# Patient Record
Sex: FEMALE | Race: WHITE | Employment: OTHER | URBAN - METROPOLITAN AREA
[De-identification: names, ages, dates, MRNs, and addresses within clinical notes are randomized per-mention and may not be internally consistent; named-entity substitution may affect disease eponyms.]

---

## 2017-08-18 ENCOUNTER — APPOINTMENT (OUTPATIENT)
Dept: GENERAL RADIOLOGY | Age: 82
DRG: 069 | End: 2017-08-18
Attending: EMERGENCY MEDICINE
Payer: MEDICARE

## 2017-08-18 ENCOUNTER — HOSPITAL ENCOUNTER (INPATIENT)
Age: 82
LOS: 1 days | Discharge: HOME | DRG: 069 | End: 2017-08-19
Attending: EMERGENCY MEDICINE | Admitting: INTERNAL MEDICINE
Payer: MEDICARE

## 2017-08-18 ENCOUNTER — APPOINTMENT (OUTPATIENT)
Dept: CT IMAGING | Age: 82
DRG: 069 | End: 2017-08-18
Attending: EMERGENCY MEDICINE
Payer: MEDICARE

## 2017-08-18 DIAGNOSIS — R47.1 DYSARTHRIA: Primary | ICD-10-CM

## 2017-08-18 PROBLEM — I63.412 STROKE DUE TO EMBOLISM OF LEFT MIDDLE CEREBRAL ARTERY (HCC): Status: ACTIVE | Noted: 2017-08-18

## 2017-08-18 LAB
ALBUMIN SERPL-MCNC: 3.6 G/DL (ref 3.2–4.6)
ALBUMIN/GLOB SERPL: 1.1 {RATIO} (ref 1.2–3.5)
ALP SERPL-CCNC: 57 U/L (ref 50–136)
ALT SERPL-CCNC: 14 U/L (ref 12–65)
ANION GAP SERPL CALC-SCNC: 6 MMOL/L (ref 7–16)
APTT PPP: 26.1 SEC (ref 23.5–31.7)
AST SERPL-CCNC: 18 U/L (ref 15–37)
BACTERIA URNS QL MICRO: ABNORMAL /HPF
BASOPHILS # BLD: 0 K/UL (ref 0–0.2)
BASOPHILS NFR BLD: 0 % (ref 0–2)
BILIRUB SERPL-MCNC: 0.6 MG/DL (ref 0.2–1.1)
BUN SERPL-MCNC: 17 MG/DL (ref 8–23)
CALCIUM SERPL-MCNC: 9.1 MG/DL (ref 8.3–10.4)
CASTS URNS QL MICRO: 0 /LPF
CHLORIDE SERPL-SCNC: 108 MMOL/L (ref 98–107)
CO2 SERPL-SCNC: 30 MMOL/L (ref 21–32)
CREAT SERPL-MCNC: 0.76 MG/DL (ref 0.6–1)
CRP SERPL-MCNC: <0.3 MG/DL (ref 0–0.9)
CRYSTALS URNS QL MICRO: 0 /LPF
DIFFERENTIAL METHOD BLD: ABNORMAL
EOSINOPHIL # BLD: 0.3 K/UL (ref 0–0.8)
EOSINOPHIL NFR BLD: 4 % (ref 0.5–7.8)
EPI CELLS #/AREA URNS HPF: ABNORMAL /HPF
ERYTHROCYTE [DISTWIDTH] IN BLOOD BY AUTOMATED COUNT: 13.2 % (ref 11.9–14.6)
ERYTHROCYTE [SEDIMENTATION RATE] IN BLOOD: 14 MM/HR (ref 0–30)
GLOBULIN SER CALC-MCNC: 3.4 G/DL (ref 2.3–3.5)
GLUCOSE BLD STRIP.AUTO-MCNC: 98 MG/DL (ref 65–100)
GLUCOSE SERPL-MCNC: 91 MG/DL (ref 65–100)
HCT VFR BLD AUTO: 39.8 % (ref 35.8–46.3)
HGB BLD-MCNC: 13.6 G/DL (ref 11.7–15.4)
IMM GRANULOCYTES # BLD: 0 K/UL (ref 0–0.5)
IMM GRANULOCYTES NFR BLD: 0.1 % (ref 0–5)
INR BLD: 1.1 (ref 0.9–1.2)
INR PPP: 1 (ref 0.9–1.2)
LYMPHOCYTES # BLD: 2.7 K/UL (ref 0.5–4.6)
LYMPHOCYTES NFR BLD: 34 % (ref 13–44)
MCH RBC QN AUTO: 31.3 PG (ref 26.1–32.9)
MCHC RBC AUTO-ENTMCNC: 34.2 G/DL (ref 31.4–35)
MCV RBC AUTO: 91.5 FL (ref 79.6–97.8)
MONOCYTES # BLD: 0.8 K/UL (ref 0.1–1.3)
MONOCYTES NFR BLD: 10 % (ref 4–12)
MUCOUS THREADS URNS QL MICRO: 0 /LPF
NEUTS SEG # BLD: 4.1 K/UL (ref 1.7–8.2)
NEUTS SEG NFR BLD: 52 % (ref 43–78)
PLATELET # BLD AUTO: 250 K/UL (ref 150–450)
PMV BLD AUTO: 9.2 FL (ref 10.8–14.1)
POTASSIUM SERPL-SCNC: 3.6 MMOL/L (ref 3.5–5.1)
PROT SERPL-MCNC: 7 G/DL (ref 6.3–8.2)
PROTHROMBIN TIME: 10.8 SEC (ref 9.6–12)
PT BLD: 13.6 SECS (ref 9.6–11.6)
RBC # BLD AUTO: 4.35 M/UL (ref 4.05–5.25)
RBC #/AREA URNS HPF: 0 /HPF
SODIUM SERPL-SCNC: 144 MMOL/L (ref 136–145)
WBC # BLD AUTO: 7.9 K/UL (ref 4.3–11.1)
WBC URNS QL MICRO: ABNORMAL /HPF

## 2017-08-18 PROCEDURE — 74011250636 HC RX REV CODE- 250/636: Performed by: INTERNAL MEDICINE

## 2017-08-18 PROCEDURE — 74011250637 HC RX REV CODE- 250/637: Performed by: EMERGENCY MEDICINE

## 2017-08-18 PROCEDURE — 93005 ELECTROCARDIOGRAM TRACING: CPT | Performed by: INTERNAL MEDICINE

## 2017-08-18 PROCEDURE — 81015 MICROSCOPIC EXAM OF URINE: CPT | Performed by: EMERGENCY MEDICINE

## 2017-08-18 PROCEDURE — 85610 PROTHROMBIN TIME: CPT | Performed by: EMERGENCY MEDICINE

## 2017-08-18 PROCEDURE — 71020 XR CHEST PA LAT: CPT

## 2017-08-18 PROCEDURE — 86140 C-REACTIVE PROTEIN: CPT | Performed by: EMERGENCY MEDICINE

## 2017-08-18 PROCEDURE — 85610 PROTHROMBIN TIME: CPT

## 2017-08-18 PROCEDURE — 85652 RBC SED RATE AUTOMATED: CPT | Performed by: EMERGENCY MEDICINE

## 2017-08-18 PROCEDURE — 99285 EMERGENCY DEPT VISIT HI MDM: CPT | Performed by: EMERGENCY MEDICINE

## 2017-08-18 PROCEDURE — 80053 COMPREHEN METABOLIC PANEL: CPT | Performed by: EMERGENCY MEDICINE

## 2017-08-18 PROCEDURE — 70450 CT HEAD/BRAIN W/O DYE: CPT

## 2017-08-18 PROCEDURE — 85730 THROMBOPLASTIN TIME PARTIAL: CPT | Performed by: EMERGENCY MEDICINE

## 2017-08-18 PROCEDURE — 74011250637 HC RX REV CODE- 250/637: Performed by: INTERNAL MEDICINE

## 2017-08-18 PROCEDURE — 82962 GLUCOSE BLOOD TEST: CPT

## 2017-08-18 PROCEDURE — 85025 COMPLETE CBC W/AUTO DIFF WBC: CPT | Performed by: EMERGENCY MEDICINE

## 2017-08-18 PROCEDURE — 65660000000 HC RM CCU STEPDOWN

## 2017-08-18 RX ORDER — SODIUM CHLORIDE 0.9 % (FLUSH) 0.9 %
5-10 SYRINGE (ML) INJECTION EVERY 8 HOURS
Status: DISCONTINUED | OUTPATIENT
Start: 2017-08-18 | End: 2017-08-19 | Stop reason: HOSPADM

## 2017-08-18 RX ORDER — PRAVASTATIN SODIUM 20 MG/1
20 TABLET ORAL
Status: DISCONTINUED | OUTPATIENT
Start: 2017-08-18 | End: 2017-08-19 | Stop reason: HOSPADM

## 2017-08-18 RX ORDER — LEVOTHYROXINE SODIUM 50 UG/1
50 TABLET ORAL
Status: DISCONTINUED | OUTPATIENT
Start: 2017-08-19 | End: 2017-08-19 | Stop reason: HOSPADM

## 2017-08-18 RX ORDER — HYDRALAZINE HYDROCHLORIDE 20 MG/ML
10 INJECTION INTRAMUSCULAR; INTRAVENOUS
Status: DISCONTINUED | OUTPATIENT
Start: 2017-08-18 | End: 2017-08-19 | Stop reason: HOSPADM

## 2017-08-18 RX ORDER — NAPROXEN 250 MG/1
250 TABLET ORAL 2 TIMES DAILY WITH MEALS
Status: DISCONTINUED | OUTPATIENT
Start: 2017-08-19 | End: 2017-08-19 | Stop reason: HOSPADM

## 2017-08-18 RX ORDER — ACETAMINOPHEN 325 MG/1
650 TABLET ORAL
Status: DISCONTINUED | OUTPATIENT
Start: 2017-08-18 | End: 2017-08-19 | Stop reason: HOSPADM

## 2017-08-18 RX ORDER — ASPIRIN 81 MG/1
162 TABLET ORAL DAILY
Status: DISCONTINUED | OUTPATIENT
Start: 2017-08-19 | End: 2017-08-18

## 2017-08-18 RX ORDER — ASPIRIN 325 MG
325 TABLET ORAL DAILY
Status: DISCONTINUED | OUTPATIENT
Start: 2017-08-19 | End: 2017-08-19 | Stop reason: HOSPADM

## 2017-08-18 RX ORDER — SODIUM CHLORIDE 0.9 % (FLUSH) 0.9 %
5-10 SYRINGE (ML) INJECTION AS NEEDED
Status: DISCONTINUED | OUTPATIENT
Start: 2017-08-18 | End: 2017-08-19 | Stop reason: HOSPADM

## 2017-08-18 RX ORDER — ALPRAZOLAM 0.25 MG/1
0.25 TABLET ORAL
Status: DISCONTINUED | OUTPATIENT
Start: 2017-08-18 | End: 2017-08-19 | Stop reason: HOSPADM

## 2017-08-18 RX ORDER — ENOXAPARIN SODIUM 100 MG/ML
40 INJECTION SUBCUTANEOUS EVERY 24 HOURS
Status: DISCONTINUED | OUTPATIENT
Start: 2017-08-18 | End: 2017-08-19 | Stop reason: HOSPADM

## 2017-08-18 RX ORDER — GUAIFENESIN 100 MG/5ML
324 LIQUID (ML) ORAL
Status: COMPLETED | OUTPATIENT
Start: 2017-08-18 | End: 2017-08-18

## 2017-08-18 RX ORDER — LANOLIN ALCOHOL/MO/W.PET/CERES
1000 CREAM (GRAM) TOPICAL DAILY
Status: DISCONTINUED | OUTPATIENT
Start: 2017-08-19 | End: 2017-08-19 | Stop reason: HOSPADM

## 2017-08-18 RX ORDER — ONDANSETRON 2 MG/ML
4 INJECTION INTRAMUSCULAR; INTRAVENOUS
Status: DISCONTINUED | OUTPATIENT
Start: 2017-08-18 | End: 2017-08-19 | Stop reason: HOSPADM

## 2017-08-18 RX ORDER — AMOXICILLIN 250 MG
2 CAPSULE ORAL
Status: DISCONTINUED | OUTPATIENT
Start: 2017-08-18 | End: 2017-08-19 | Stop reason: HOSPADM

## 2017-08-18 RX ADMIN — STANDARDIZED SENNA CONCENTRATE AND DOCUSATE SODIUM 2 TABLET: 8.6; 5 TABLET, FILM COATED ORAL at 22:04

## 2017-08-18 RX ADMIN — Medication 10 ML: at 22:11

## 2017-08-18 RX ADMIN — PRAVASTATIN SODIUM 20 MG: 20 TABLET ORAL at 22:09

## 2017-08-18 RX ADMIN — ENOXAPARIN SODIUM 40 MG: 40 INJECTION SUBCUTANEOUS at 22:04

## 2017-08-18 RX ADMIN — LACTULOSE 10 G: 20 SOLUTION ORAL at 22:08

## 2017-08-18 RX ADMIN — ASPIRIN 81 MG 324 MG: 81 TABLET ORAL at 18:17

## 2017-08-18 NOTE — IP AVS SNAPSHOT
Addi Trujillo 
 
 
 [facility] 
[phone] Patient: Ivet Angela MRN: [id number] MLP:[id number] You are allergic to the following Allergen Reactions Crestor (Rosuvastatin) Other (comments) Gabapentin Other (comments) Sulfa (Sulfonamide Antibiotics) Unknown (comments) Zetia (Ezetimibe) Other (comments) Recent Documentation Height Weight BMI Smoking Status 1.676 m 65.8 kg 22.05 kg/m2 Never Smoker Emergency Contacts Name Discharge Info Relation Home Work Mobile Jennifer Nuñez  Other Relative [6] [phone] Maren Vitale  Brother [24] [phone] About your hospitalization You were admitted on:  August 18, 2017 You last received care in the:  [address] SURG You were discharged on:  August 19, 2017 Unit phone number:  [phone] Why you were hospitalized Your primary diagnosis was:  Not on File Your diagnoses also included:  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), Geno Rhythm Disorder, Dyslipidemia, Hypertension, Tia (Transient Ischemic Attack) Providers Seen During Your Hospitalizations Provider Role Specialty Primary office phone Alma Mcbride MD Attending Provider Emergency Medicine [phone] Princess Breann MD Attending Provider Internal Medicine [phone] Your Primary Care Physician (PCP) Primary Care Physician Office Phone Office Fax Sung Randall [phone] Follow-up Information Follow up With Details Comments Contact Info Kavitha Adler MD Call Call on Monday for an appointment in 1 week [facility] [address] [place] [address] [employer] [place] [address] [phone] Current Discharge Medication List  
  
START taking these medications Dose & Instructions Dispensing Information Comments Morning Noon Evening Bedtime  
 pravastatin 20 mg tablet Commonly known as:  PRAVACHOL Your next dose is:  Tomorrow at bedtime Dose:  20 mg Take 1 Tab by mouth nightly. Quantity:  30 Tab Refills:  3 CONTINUE these medications which have CHANGED Dose & Instructions Dispensing Information Comments Morning Noon Evening Bedtime  
 aspirin delayed-release 81 mg tablet What changed:  how much to take Your next dose is:  Tomorrow AM  
   
 Dose:  162 mg Take 2 Tabs by mouth daily. Quantity:  60 Tab Refills:  11 CONTINUE these medications which have NOT CHANGED Dose & Instructions Dispensing Information Comments Morning Noon Evening Bedtime ALPRAZolam 0.25 mg tablet Commonly known as:  Shermon Colljudith Your next dose is:  Tomorrow Take  by mouth. Refills:  0  
     
   
   
   
  
 cholecalciferol (VITAMIN D3) 5,000 unit Tab tablet Commonly known as:  VITAMIN D3 Your next dose is:  Tomorrow Take  by mouth daily. Refills:  0  
     
   
   
   
  
 cyanocobalamin 1,000 mcg sublingual tablet Commonly known as:  VITAMIN B-12 Dose:  1000 mcg Take 1,000 mcg by mouth daily. Refills:  0  
     
   
   
   
  
 lactulose 10 gram/15 mL solution Commonly known as:  Khadijah Basket Your last dose was:  8/19/17 @ 5:00 PM  
Your next dose is:  Tomorrow AM  
   
 Take  by mouth three (3) times daily. Refills:  0  
     
   
   
   
  
 levothyroxine 50 mcg tablet Commonly known as:  SYNTHROID Your next dose is:  Tomorrow Take  by mouth Daily (before breakfast). Refills:  0  
     
   
   
   
  
 meclizine 25 mg tablet Commonly known as:  ANTIVERT Your next dose is:  When needed Take  by mouth three (3) times daily as needed. Refills:  0  
     
   
   
   
  
 naproxen sodium 220 mg Cap Your last dose was:  8/19/17 @ 5:00 PM  
Your next dose is:  Tomorrow AM  
   
 Take  by mouth. Refills:  0  
     
   
   
   
  
 vitamin E 400 unit capsule Commonly known as:  [address] Your next dose is:  Tomorrow Take  by mouth daily. Refills:  0 Where to Get Your Medications These medications were sent to [facility] [address] [facility]. [address]  [place] [address] [place] [address] Phone:  [phone]  
  aspirin delayed-release 81 mg tablet  
 pravastatin 20 mg tablet Discharge Instructions Taking Aspirin to Prevent Heart Attack and Stroke: Care Instructions Your Care Instructions Aspirin acts as a \"blood thinner. \" It prevents blood clots from forming. When taken during and after a heart attack, it can reduce your chance of dying. And it's used if you have a stent in your coronary artery. Also, aspirin helps certain people lower their risk of a heart attack or stroke. Be sure you know what dose of aspirin to take and how often to take it. Low-dose aspirin is typically 81 mg. But the dose for daily aspirin can range from 81 mg to 325 mg. Taking aspirin every day can cause bleeding. It may not be safe if you have stomach ulcers. And it may not be safe if you have high blood pressure that is not controlled. If you take aspirin pills every day, do not take ones that have other ingredients such as caffeine or sodium. Before you start to take aspirin, tell your doctor all the medicines, vitamins, herbal products, and supplements you take. Follow-up care is a key part of your treatment and safety. Be sure to make and go to all appointments, and call your doctor if you are having problems. It's also a good idea to know your test results and keep a list of the medicines you take. How can you care for yourself at home? · Take aspirin with a full glass of water unless your doctor tells you not to. Do not lie down right after you take it. · If you have a stent in your coronary artery, take your aspirin as your heart doctor says to. If another doctor says to stop taking the aspirin for any reason, talk to your heart doctor before you stop. · Do not chew or crush the coated or sustained-release forms of aspirin. · Ask your doctor if you can drink alcohol while you take aspirin. And ask how much you can drink. Too much alcohol with aspirin can cause stomach bleeding. · Do not take aspirin if you are pregnant, unless your doctor says it is okay. · Keep all aspirin out of children's reach. · Throw aspirin away if it starts to smell like vinegar. · Do not take aspirin if you have gout or if you take prescription blood thinners, unless your doctor has told you to. · Do not take prescription or over-the-counter medicines, vitamins, herbal products, or supplements without talking to your doctor first. Hilary Pintoly the label before you take another over-the-counter medicine. Many contain aspirin. So they could cause you to take too much aspirin. · Talk with your doctor before you take a pain medicine. Ask which type of medicine you can take and how to take it safely with aspirin. · Tell your doctor or dentist before a surgery or procedure that you take aspirin. He or she will tell you if you should stop taking aspirin before your surgery or procedure. Make sure that you understand exactly what your doctor wants you to do. Where can you learn more? Go to http://daniel-iam.info/. Enter E015 in the search box to learn more about \"Taking Aspirin to Prevent Heart Attack and Stroke: Care Instructions. \" Current as of: April 3, 2017 Content Version: 11.3 © 5850-0709 [employer]. Care instructions adapted under license by [employer] (which disclaims liability or warranty for this information).  If you have questions about a medical condition or this instruction, always ask your healthcare professional. Tommie Ibarra, [facility] disclaims any warranty or liability for your use of this information. Learning About Antiplatelet Medicines After a Stroke Introduction If you have had a stroke, you may have concerns about having another one. You want to do all you can do to avoid this. If your stroke was caused by a blood clot, one of the best things you can do is to take antiplatelet medicines. They can help prevent another stroke. In most cases, you don't take them if you had a stroke caused by a leak in an artery. These medicines are often called blood thinners. But they don't thin your blood. They work to keep platelets from sticking together and forming blood clots. (A platelet is a type of blood cell.) Blood clots can cause a stroke if they block a blood vessel in the brain. So by preventing blood clots, you are helping to prevent a stroke. Examples · Aspirin (Kaiden, Bufferin, Ecotrin) · Aspirin with dipyridamole (Aggrenox) · Clopidogrel (Plavix) Possible side effects These medicines make your blood take longer than normal to clot. This can cause bleeding, and you may bruise easily. In rare cases, they can cause you to bleed inside your body without an injury. If you have an injury, you might have bleeding that is hard to control. These medicines may have other side effects. Depending on which one you take, you may: 
· Have diarrhea. · Feel sick to your stomach. · Have a headache. · Have some mild belly pain. You may have other side effects or reactions not listed here. Check the information that comes with your medicine. What to know about taking this medicine · Be sure you get instructions about how to take your medicine safely. Blood thinners can cause serious bleeding problems. · Be safe with medicines. Take your medicines exactly as prescribed. Call your doctor if you think you are having a problem with your medicine.  
· Check with your doctor or pharmacist before you use any other medicines, including over-the-counter medicines. Make sure your doctor knows all of the medicines, vitamins, herbal products, and supplements you take. Taking some medicines together can cause problems. Where can you learn more? Go to http://daniel-iam.info/. Enter A661 in the search box to learn more about \"Learning About Antiplatelet Medicines After a Stroke. \" Current as of: November 17, 2016 Content Version: 11.3 © 4047-4387 [employer]. Care instructions adapted under license by [employer] (which disclaims liability or warranty for this information). If you have questions about a medical condition or this instruction, always ask your healthcare professional. [address] any warranty or liability for your use of this information. Bradycardia: Care Instructions Your Care Instructions Bradycardia is a slow heart rate. If your heart beats too slowly, it can't supply your body with enough blood. This can make you weak or dizzy. Or it may make you pass out. Sometimes medicine can cause this problem. If this happens, your doctor may have you adjust one of your medicines. If a medicine is not the problem, your doctor may recommend a pacemaker. It is important to treat bradycardia so that you don't get more serious health problems. Your doctor will want to see you on a routine schedule to make sure that your heartbeat is normal. 
Follow-up care is a key part of your treatment and safety. Be sure to make and go to all appointments, and call your doctor if you are having problems. It's also a good idea to know your test results and keep a list of the medicines you take. How can you care for yourself at home? · Take your medicines exactly as prescribed. Call your doctor if you think you are having a problem with your medicine. If your bradycardia is caused by another disease, your doctor will try to treat the disease.  If it is caused by heart medicines, he or she will adjust your medicines. · Make lifestyle changes to improve your heart health. ¨ To control your cholesterol, avoid foods with a lot of fat, saturated fat, or sodium. Try to eat more fiber. And if your doctor says it's okay, get some exercise on most days. ¨ Do not smoke. Smoking can make your heart condition worse. If you need help quitting, talk to your doctor about stop-smoking programs and medicines. These can increase your chances of quitting for good. ¨ Limit alcohol to 2 drinks a day for men and 1 drink a day for women. Too much alcohol can cause health problems. Pacemaker If you have a pacemaker, you will get more specific information about it. Be sure to: · Check your pulse as your doctor tells you. · Have your pacemaker checked as often as your doctor recommends. You may be able to do this over the phone or computer. · Avoid strong magnetic or electrical fields. These include wand metal detectors used in airports, MRIs, welding equipment, and generators. · You will be checked several times right after you get your pacemaker and when it is time to have the battery changed. Batteries last for 5 to 15 years. · You can talk on a cell phone. But keep it 6 inches away from your pacemaker. · Microwaves, TVs, radios, and kitchen and bathroom appliances won't harm you. When should you call for help? Call 911 anytime you think you may need emergency care. For example, call if: 
· You passed out (lost consciousness). · You have symptoms of a heart attack. These may include: ¨ Chest pain or pressure, or a strange feeling in the chest. 
¨ Sweating. ¨ Shortness of breath. ¨ Nausea or vomiting. ¨ Pain, pressure, or a strange feeling in the back, neck, jaw, or upper belly or in one or both shoulders or arms. ¨ Lightheadedness or sudden weakness. ¨ A fast or irregular heartbeat.  
After you call 911, the  may tell you to chew 1 adult-strength or 2 to 4 low-dose aspirin. Wait for an ambulance. Do not try to drive yourself. · You have symptoms of a stroke. These may include: 
¨ Sudden numbness, tingling, weakness, or loss of movement in your face, arm, or leg, especially on only one side of your body. ¨ Sudden vision changes. ¨ Sudden trouble speaking. ¨ Sudden confusion or trouble understanding simple statements. ¨ Sudden problems with walking or balance. ¨ A sudden, severe headache that is different from past headaches. Call your doctor now or seek immediate medical care if: 
· You are dizzy or lightheaded, or you feel like you may faint. · You have new or increased shortness of breath. · You had a pacemaker implanted and you have signs of infection, such as: 
¨ Increased pain, swelling, warmth, or redness. ¨ Red streaks leading from the cut (incision). ¨ Pus draining from the incision. ¨ A fever. Watch closely for changes in your health, and be sure to contact your doctor if you have any problems. Where can you learn more? Go to http://daniel-iam.info/. Enter S326 in the search box to learn more about \"Bradycardia: Care Instructions. \" Current as of: April 3, 2017 Content Version: 11.3 © 9161-2075 [employer]. Care instructions adapted under license by [employer] (which disclaims liability or warranty for this information). If you have questions about a medical condition or this instruction, always ask your healthcare professional. [place] [address] any warranty or liability for your use of this information. Learning About Screening for Heart Attack and Stroke Risk What is screening for heart attack and stroke risk? Screening for heart attack and stroke risk is a way for your doctor to check your chance of having a problem called atherosclerosis. This problem is also called hardening of the arteries.  It is the starting point for most heart and blood flow problems, such as heart disease, stroke, and peripheral arterial disease. You and your doctor can use your risk score to decide if you want to take steps to lower your risk. How can you find out your risk? Your doctor looks at things that put you at risk for a heart attack and stroke. He or she might look at many things, such as: 
· Your cholesterol levels. · Your blood pressure. · Your age. · Your race. · Whether you are male or female. · Whether or not you smoke. Your doctor might use a tool to calculate a risk score for you. There are different tools that doctors use. They may show that your risk is higher or lower than it really is. But the tools give you and your doctor a good idea about your risk. What happens after screening? Knowing your risk can help you and your doctor talk about whether to take steps to lower your risk. A heart-healthy lifestyle is important for everyone. Some people also take medicine to lower their risk. You and your doctor can work together to decide what is best for you. Where can you learn more? Go to http://daniel-iam.info/. Enter J284 in the search box to learn more about \"Learning About Screening for Heart Attack and Stroke Risk. \" Current as of: October 26, 2016 Content Version: 11.3 © 6212-8831 [employer]. Care instructions adapted under license by [employer] (which disclaims liability or warranty for this information). If you have questions about a medical condition or this instruction, always ask your healthcare professional. [place] [address] any warranty or liability for your use of this information. Stroke: After Your Visit Your Care Instructions You have had a stroke. Risk factors for stroke include being overweight, smoking, and sedentary lifestyle.  This means that the blood flow to a part of your brain was blocked for some time, which damages the nerve cells in that part of the brain. The part of your body controlled by that part of your brain may not function properly now. The brain is an amazing organ that can heal itself to some degree. The stroke you had damaged part of your brain, but other parts of your brain may take over in some way for the damaged areas. You have already started this process. Going home may be hard for you and your family. The more you can try to do for yourself, the better. Remember to take each day one at a time. Follow-up care is a key part of your treatment and safety. Be sure to make and go to all appointments, and call your doctor if you are having problems. Its also a good idea to know your test results and keep a list of the medicines you take. How can you care for yourself at home? Enter a stroke rehabilitation (rehab) program, if your doctor recommends it. Physical, speech, and occupational therapies can help you manage bathing, dressing, eating, and other basics of daily living. Eat a heart-healthy diet that is low in cholesterol, saturated fat, and salt. Eat lots of fresh fruits and vegetables and foods high in fiber. Increase your activities slowly. Take short rest breaks when you get tired. Gradually increase the amount you walk. Start out by walking a little more than you did the day before. Do not drive until your doctor says it is okay. It is normal to feel sad or depressed after a stroke. If the blues last, talk to your doctor. If you are having problems with urine leakage, go to the bathroom at regular times, including when you first wake up and at bedtime. Also, limit fluids after dinner. If you are constipated, drink plenty of fluids, enough so that your urine is light yellow or clear like water.  If you have kidney, heart, or liver disease and have to limit fluids, talk with your doctor before you increase the amount of fluids you drink. Set up a regular time for using the toilet. If you continue to have constipation, your doctor may suggest using a bulking agent, such as Metamucil, or a stool softener, laxative, or enema. Medicines Take your medicines exactly as prescribed. Call your doctor if you think you are having a problem with your medicine. You may be taking several medicines. ACE (angiotensin-converting enzyme) inhibitors, angiotensin II receptor blockers (ARBs), beta-blockers, diuretics (water pills), and calcium channel blockers control your blood pressure. Statins help lower cholesterol. Your doctor may also prescribe medicines for depression, pain, sleep problems, anxiety, or agitation. If your doctor has given you medicine that prevents blood clots, such as warfarin (Coumadin), aspirin combined with extended-release dipyridamole (Aggrenox), clopidogrel (Plavix), or aspirin to prevent another stroke, you should: 
Tell your dentist, pharmacist, and other health professionals that you take these medicines. Watch for unusual bruising or bleeding, such as blood in your urine, red or black stools, or bleeding from your nose or gums. Get regular blood tests to check your clotting time if you are taking Coumadin. Wear medical alert jewelry that says you take blood thinners. You can buy this at most drugstores. Do not take any over-the-counter medicines or herbal products without talking to your doctor first. 
If you take birth control pills or hormone replacement therapy, talk to your doctor about whether they are right for you. For family members and caregivers Make the home safe. Set up a room so that your loved one does not have to climb stairs. Be sure the bathroom is on the same floor. Move throw rugs and furniture that could cause falls, and make sure that the lighting is good. Put grab bars and seats in tubs and showers. Find out what your loved one can do and what he or she needs help with. Try not to do things for your loved one that your loved one can do on his or her own. Help him or her learn and practice new skills. Visit and talk with your loved one often. Try doing activities together that you both enjoy, such as playing cards or board games. Keep in touch with your loved one's friends as much as you can, and encourage them to visit. Take care of yourself. Do not try to do everything yourself. Ask other family members to help. Eat well, get enough rest, and take time to do things that you enjoy. Keep up with your own doctor visits, and make sure to take your medicines regularly. Get out of the house as much as you can. Join a local support group. Find out if you qualify for home health care visits to help with rehab or for adult day care. When should you call for help? Call 911 anytime you think you may need emergency care. For example, call if: 
You have signs of another stroke. These may include: 
Sudden numbness, paralysis, or weakness in your face, arm, or leg, especially on only one side of your body. New problems with walking or balance. Sudden vision changes. Drooling or slurred speech. New problems speaking or understanding simple statements, or you feel confused. A sudden, severe headache that is different from past headaches. Call 911 even if these symptoms go away in a few minutes. You cough up blood. You vomit blood or what looks like coffee grounds. You pass maroon or very bloody stools. Call your doctor now or seek immediate medical care if: 
You have new bruises or blood spots under your skin. You have a nosebleed. Your gums bleed when you brush your teeth. You have blood in your urine. Your stools are black and tarlike or have streaks of blood. You have vaginal bleeding when you are not having your period, or heavy period bleeding. You have new symptoms that may be related to your stroke, such as falls or trouble swallowing. Watch closely for changes in your health, and be sure to contact your doctor if you have any problems. Where can you learn more? Go to [employer] Enter Z633  in the search box to learn more about \"Stroke: After Your Visit\". © 2744-2571 Healthwise, Incorporated. Care instructions adapted under license by [employer] (which disclaims liability or warranty for this information). This care instruction is for use with your licensed healthcare professional. If you have questions about a medical condition or this instruction, always ask your healthcare professional. [place] any warranty or liability for your use of this information. Discharge Orders None vWise Announcement We are excited to announce that we are making your provider's discharge notes available to you in vWise. You will see these notes when they are completed and signed by the physician that discharged you from your recent hospital stay. If you have any questions or concerns about any information you see in vWise, please call the Health Information Department where you were seen or reach out to your Primary Care Provider for more information about your plan of care. General Information Please provide this summary of care documentation to your next provider. Patient Signature:  ____________________________________________________________ Date:  ____________________________________________________________  
  
Denisse Deluna Provider Signature:  ____________________________________________________________ Date:  ____________________________________________________________

## 2017-08-18 NOTE — H&P
History and Physical       Hospitalist Admission History and Physical     NAME:  Lang Gonsales   Age:  [de-identified] y.o.  :   1935   MRN:   [id number]  PCP: Jason Cazares MD  Consulting MD:  Treatment Team: Attending Provider: Promise Chaves MD; Primary Nurse: Raul Manriquez RN    Chief Complaint   Patient presents with    Dysarthria    Dizziness         HPI: c   Patient is a [de-identified] y.o. female who presents with acute speech changes here in the hospital while checking on her  who is about to be discharged to rehab after fractured hip. She initially had garbled speech according to family and alos noted to have some confusion and unsteadiness on her feet. Initial head CT was negative for acute bleed or CVA. She also noted to have some left facial assymetry. She does take baby asa daily. She has had no palpitations and was recently checked by her cardiologist a Dr. Sharmila Epperson with heart monitor and this was negative. No recent echo. Past Medical History:   Diagnosis Date    Dyslipidemia 2016    HTN (hypertension), benign 2016    Geno rhythm disorder 2016    Numbness and tingling 2016    Precordial pain 2016    Syncope         Past Surgical History:   Procedure Laterality Date    HX PARTIAL HYSTERECTOMY          Family History   Problem Relation Age of Onset    Arrhythmia Mother     Coronary Artery Disease Mother        Social History     Social History Narrative        Social History   Substance Use Topics    Smoking status: Never Smoker    Smokeless tobacco: Not on file    Alcohol use No        History   Drug Use Not on file         Allergies   Allergen Reactions    Crestor [Rosuvastatin] Other (comments)    Gabapentin Other (comments)    Sulfa (Sulfonamide Antibiotics) Unknown (comments)    Zetia [Ezetimibe] Other (comments)       Prior to Admission medications    Medication Sig Start Date End Date Taking?  Authorizing Provider   cholecalciferol, VITAMIN D3, (VITAMIN D3) 5,000 unit tab tablet Take  by mouth daily. Historical Provider   naproxen sodium 220 mg cap Take  by mouth. Historical Provider   vitamin E (AQUA GEMS) 400 unit capsule Take  by mouth daily. Historical Provider   cyanocobalamin (VITAMIN B-12) 1,000 mcg sublingual tablet Take 1,000 mcg by mouth daily. Historical Provider   aspirin delayed-release 81 mg tablet Take  by mouth daily. Historical Provider   levothyroxine (SYNTHROID) 50 mcg tablet Take  by mouth Daily (before breakfast). Historical Provider   lactulose (CHRONULAC) 10 gram/15 mL solution Take  by mouth three (3) times daily. Historical Provider   ALPRAZolam Pan Cowman) 0.25 mg tablet Take  by mouth. Historical Provider   meclizine (ANTIVERT) 25 mg tablet Take  by mouth three (3) times daily as needed.     Historical Provider           Review of Systems    Constitutional: well nourished male in NAD  Eyes:  no change in visual acuity, no photophobia  Ears, nose, mouth, throat, and face: no  Odynphagia, dysphagia, no thrush or exudate, negative for chronic sinus congestion, recurrent headaches  Respiratory: negative for SOB, hemoptysis or cough  Cardiovascular: negative for CP, palpitations, or PND  Gastrointestinal: negative for abdominal pain, no hematemesis, hematochezia or BRBPR  Genitourinary: no urgency, frequency, or dysuria, no nocturia  Integument/breast: negative for skin rash or skin lesions  Hematologic/lymphatic: negative for known bleeding disorder  Musculoskeletal:negative for joint pain or joint tenderness  Neurological: slurred speech and unsteadiness  Behavioral/Psych: negative for depression or chronic anxiety,   Endocrine: negative for polydyspia, polyuria or intolerance to heat or cold  Allergic/Immunologic: negative for chronic allergic rhinitis, or known connective tissue disorder      Objective:     Visit Vitals    /68 (BP Patient Position: At rest)    Pulse 67    Temp 97.6 °F (36.4 °C)    Resp 16    Ht 5' 6\" (1.676 m)    Wt 70.8 kg (156 lb)    SpO2 97%    BMI 25.18 kg/m2                Data Review:   Recent Results (from the past 24 hour(s))   POC PT/INR    Collection Time: 08/18/17  3:29 PM   Result Value Ref Range    Prothrombin time (POC) 13.6 (H) 9.6 - 11.6 SECS    INR (POC) 1.1 0.9 - 1.2     CBC WITH AUTOMATED DIFF    Collection Time: 08/18/17  3:30 PM   Result Value Ref Range    WBC 7.9 4.3 - 11.1 K/uL    RBC 4.35 4.05 - 5.25 M/uL    HGB 13.6 11.7 - 15.4 g/dL    HCT 39.8 35.8 - 46.3 %    MCV 91.5 79.6 - 97.8 FL    MCH 31.3 26.1 - 32.9 PG    MCHC 34.2 31.4 - 35.0 g/dL    RDW 13.2 11.9 - 14.6 %    PLATELET 524 727 - 931 K/uL    MPV 9.2 (L) 10.8 - 14.1 FL    DF AUTOMATED      NEUTROPHILS 52 43 - 78 %    LYMPHOCYTES 34 13 - 44 %    MONOCYTES 10 4.0 - 12.0 %    EOSINOPHILS 4 0.5 - 7.8 %    BASOPHILS 0 0.0 - 2.0 %    IMMATURE GRANULOCYTES 0.1 0.0 - 5.0 %    ABS. NEUTROPHILS 4.1 1.7 - 8.2 K/UL    ABS. LYMPHOCYTES 2.7 0.5 - 4.6 K/UL    ABS. MONOCYTES 0.8 0.1 - 1.3 K/UL    ABS. EOSINOPHILS 0.3 0.0 - 0.8 K/UL    ABS. BASOPHILS 0.0 0.0 - 0.2 K/UL    ABS. IMM. GRANS. 0.0 0.0 - 0.5 K/UL   METABOLIC PANEL, COMPREHENSIVE    Collection Time: 08/18/17  3:30 PM   Result Value Ref Range    Sodium 144 136 - 145 mmol/L    Potassium 3.6 3.5 - 5.1 mmol/L    Chloride 108 (H) 98 - 107 mmol/L    CO2 30 21 - 32 mmol/L    Anion gap 6 (L) 7 - 16 mmol/L    Glucose 91 65 - 100 mg/dL    BUN 17 8 - 23 MG/DL    Creatinine 0.76 0.6 - 1.0 MG/DL    GFR est AA >60 >60 ml/min/1.73m2    GFR est non-AA >60 >60 ml/min/1.73m2    Calcium 9.1 8.3 - 10.4 MG/DL    Bilirubin, total 0.6 0.2 - 1.1 MG/DL    ALT (SGPT) 14 12 - 65 U/L    AST (SGOT) 18 15 - 37 U/L    Alk.  phosphatase 57 50 - 136 U/L    Protein, total 7.0 6.3 - 8.2 g/dL    Albumin 3.6 3.2 - 4.6 g/dL    Globulin 3.4 2.3 - 3.5 g/dL    A-G Ratio 1.1 (L) 1.2 - 3.5     PTT    Collection Time: 08/18/17  3:30 PM   Result Value Ref Range    aPTT 26.1 23.5 - 31.7 SEC   PROTHROMBIN TIME + INR Collection Time: 08/18/17  3:30 PM   Result Value Ref Range    Prothrombin time 10.8 9.6 - 12.0 sec    INR 1.0 0.9 - 1.2     SED RATE, AUTOMATED    Collection Time: 08/18/17  3:30 PM   Result Value Ref Range    Sed rate, automated 14 0 - 30 mm/hr   C REACTIVE PROTEIN, QT    Collection Time: 08/18/17  3:30 PM   Result Value Ref Range    C-Reactive protein <0.3 0.0 - 0.9 mg/dL   GLUCOSE, POC    Collection Time: 08/18/17  3:31 PM   Result Value Ref Range    Glucose (POC) 98 65 - 100 mg/dL   URINE MICROSCOPIC    Collection Time: 08/18/17  4:13 PM   Result Value Ref Range    WBC 5-10 0 /hpf    RBC 0 0 /hpf    Epithelial cells 0-3 0 /hpf    Bacteria 4+ (H) 0 /hpf    Casts 0 0 /lpf    Crystals, urine 0 0 /LPF    Mucus 0 0 /lpf       Physical Exam:     General:  Alert, cooperative, no distress, appears stated age. right facial droop noted, speech was fluent generally   Eyes:  Conjunctivae/corneas clear. PERRL, EOMs intact. Ears:  Normal TMs and external ear canals both ears. Nose: Nares normal. Septum midline. Mucosa normal. No drainage or sinus tenderness. Mouth/Throat: Lips, mucosa, and tongue normal. Teeth and gums normal.   Neck: Supple, symmetrical, trachea midline, no adenopathy, thyroid: no enlargment/tenderness/nodules, no carotid bruit and no JVD. Back:   Symmetric, no curvature. ROM normal. No CVA tenderness. Lungs:   Clear to auscultation bilaterally. Heart:  Regular rate and rhythm, S1, S2 normal, no murmur, click, rub or gallop. Abdomen:   Soft, non-tender. Bowel sounds normal. No masses,  No organomegaly. Extremities: Extremities normal, atraumatic, no cyanosis or edema. Pulses: 2+ and symmetric all extremities. Skin: Skin color, texture, turgor normal. No rashes or lesions   Lymph nodes: Cervical, supraclavicular, and axillary nodes normal.   Neurologic: CNII-XII intact. Normal strength, sensation and reflexes throughout.  Question of left facial assymetry     Assessment and Plan: full dose asa and pravastatin will be started. Reviewed her allergy situation to statins with her daughter and patient. The reaction to Crestor was very vague and more of just not feeling well. So may not be true allergy.       Active Problems:    Stroke due to embolism of left middle cerebral artery ([facility] [address] (8/18/2017)        Signed By: Sofiya Mac MD   August 18, 2017

## 2017-08-18 NOTE — ED PROVIDER NOTES
HPI Comments: 28-year-old white female with history of hypertension and dyslipidemia presents with slurred speech which began approximately 2 hours prior to arrival.  She was at the hospital visiting her  who recently underwent hip surgery. She does report a mild headache. No chest pain or shortness of breath. No numbness or weakness in her extremities. No previous CVA. Patient is a [de-identified] y.o. female presenting with context and dizziness. The history is provided by the patient. Dysarthria   Associated symptoms include headaches. Pertinent negatives include no shortness of breath, no chest pain, no vomiting and no nausea. Dizziness   Associated symptoms include headaches. Pertinent negatives include no shortness of breath, no chest pain, no vomiting and no nausea. Past Medical History:   Diagnosis Date    Dyslipidemia 7/25/2016    HTN (hypertension), benign 7/25/2016    Geno rhythm disorder 7/25/2016    Numbness and tingling 7/25/2016    Precordial pain 7/25/2016    Syncope        Past Surgical History:   Procedure Laterality Date    HX PARTIAL HYSTERECTOMY           Family History:   Problem Relation Age of Onset    Arrhythmia Mother     Coronary Artery Disease Mother        Social History     Social History    Marital status:      Spouse name: N/A    Number of children: N/A    Years of education: N/A     Occupational History    Not on file. Social History Main Topics    Smoking status: Never Smoker    Smokeless tobacco: Not on file    Alcohol use No    Drug use: Not on file    Sexual activity: Not on file     Other Topics Concern    Not on file     Social History Narrative         ALLERGIES: Crestor [rosuvastatin]; Gabapentin; Sulfa (sulfonamide antibiotics); and Zetia [ezetimibe]    Review of Systems   Constitutional: Negative for fever. HENT: Negative for congestion. Respiratory: Negative for cough and shortness of breath.     Cardiovascular: Negative for chest pain. Gastrointestinal: Negative for abdominal pain, nausea and vomiting. Genitourinary: Negative for dysuria. Musculoskeletal: Negative for back pain and neck pain. Skin: Negative for rash. Neurological: Positive for dizziness and headaches. Vitals:    08/18/17 1508   BP: 156/66   Pulse: 65   Resp: 18   Temp: 98.1 °F (36.7 °C)   SpO2: 97%   Weight: 70.8 kg (156 lb)   Height: 5' 6\" (1.676 m)            Physical Exam   Constitutional: She is oriented to person, place, and time. She appears well-developed and well-nourished. No distress. HENT:   Head: Normocephalic and atraumatic. Mouth/Throat: Oropharynx is clear and moist.   Eyes: Conjunctivae are normal. Pupils are equal, round, and reactive to light. Neck: Normal range of motion. Neck supple. Cardiovascular: Normal rate and regular rhythm. No murmur heard. Pulmonary/Chest: Effort normal and breath sounds normal. She has no wheezes. Abdominal: Soft. There is no tenderness. Musculoskeletal: Normal range of motion. She exhibits no edema. Neurological: She is alert and oriented to person, place, and time. No cranial nerve deficit. She exhibits normal muscle tone. Coordination normal.   Speech is a bit slow and at times words are mixed up. Nursing note and vitals reviewed. MDM  Number of Diagnoses or Management Options  Diagnosis management comments: EKG shows normal sinus rhythm. Head CT shows no acute abnormality. Patient has been seen by neurology who recommends admission for stroke workup but at this time does not recommend TPA as her symptoms are currently mild.   Discussed with hospitalist for admission       Amount and/or Complexity of Data Reviewed  Clinical lab tests: ordered and reviewed  Tests in the radiology section of CPT®: ordered and reviewed  Tests in the medicine section of CPT®: ordered and reviewed  Discuss the patient with other providers: yes  Independent visualization of images, tracings, or specimens: yes    Risk of Complications, Morbidity, and/or Mortality  Presenting problems: moderate  Diagnostic procedures: moderate  Management options: moderate      ED Course       Procedures

## 2017-08-18 NOTE — ED TRIAGE NOTES
Pt arrive from 7th floor, RN that assisted pt states family was concerned pt was having slurred speech and dizziness. Family states symptoms started 2 hours ago.  Dr. Cindy Smith at pt side in triage for eval.

## 2017-08-19 ENCOUNTER — APPOINTMENT (OUTPATIENT)
Dept: MRI IMAGING | Age: 82
DRG: 069 | End: 2017-08-19
Attending: INTERNAL MEDICINE
Payer: MEDICARE

## 2017-08-19 VITALS
SYSTOLIC BLOOD PRESSURE: 163 MMHG | WEIGHT: 145 LBS | OXYGEN SATURATION: 98 % | TEMPERATURE: 98 F | RESPIRATION RATE: 19 BRPM | HEIGHT: 66 IN | BODY MASS INDEX: 23.3 KG/M2 | DIASTOLIC BLOOD PRESSURE: 87 MMHG | HEART RATE: 63 BPM

## 2017-08-19 PROBLEM — G45.9 TIA (TRANSIENT ISCHEMIC ATTACK): Status: ACTIVE | Noted: 2017-08-19

## 2017-08-19 LAB
ATRIAL RATE: 70 BPM
CALCULATED P AXIS, ECG09: 65 DEGREES
CALCULATED R AXIS, ECG10: 23 DEGREES
CALCULATED T AXIS, ECG11: 71 DEGREES
CHOLEST SERPL-MCNC: 142 MG/DL
DIAGNOSIS, 93000: NORMAL
EST. AVERAGE GLUCOSE BLD GHB EST-MCNC: 117 MG/DL
HBA1C MFR BLD: 5.7 % (ref 4.8–6)
HDLC SERPL-MCNC: 34 MG/DL (ref 40–60)
HDLC SERPL: 4.2 {RATIO}
LDLC SERPL CALC-MCNC: 82 MG/DL
LIPID PROFILE,FLP: ABNORMAL
P-R INTERVAL, ECG05: 174 MS
Q-T INTERVAL, ECG07: 388 MS
QRS DURATION, ECG06: 78 MS
QTC CALCULATION (BEZET), ECG08: 403 MS
TRIGL SERPL-MCNC: 130 MG/DL (ref 35–150)
VENTRICULAR RATE, ECG03: 65 BPM
VLDLC SERPL CALC-MCNC: 26 MG/DL (ref 6–23)

## 2017-08-19 PROCEDURE — 83036 HEMOGLOBIN GLYCOSYLATED A1C: CPT | Performed by: INTERNAL MEDICINE

## 2017-08-19 PROCEDURE — 92610 EVALUATE SWALLOWING FUNCTION: CPT

## 2017-08-19 PROCEDURE — 80061 LIPID PANEL: CPT | Performed by: INTERNAL MEDICINE

## 2017-08-19 PROCEDURE — 97161 PT EVAL LOW COMPLEX 20 MIN: CPT

## 2017-08-19 PROCEDURE — 74011000258 HC RX REV CODE- 258: Performed by: INTERNAL MEDICINE

## 2017-08-19 PROCEDURE — 36415 COLL VENOUS BLD VENIPUNCTURE: CPT | Performed by: INTERNAL MEDICINE

## 2017-08-19 PROCEDURE — 70548 MR ANGIOGRAPHY NECK W/DYE: CPT

## 2017-08-19 PROCEDURE — 74011250636 HC RX REV CODE- 250/636: Performed by: INTERNAL MEDICINE

## 2017-08-19 PROCEDURE — 93306 TTE W/DOPPLER COMPLETE: CPT

## 2017-08-19 PROCEDURE — 74011250637 HC RX REV CODE- 250/637: Performed by: INTERNAL MEDICINE

## 2017-08-19 PROCEDURE — A9577 INJ MULTIHANCE: HCPCS | Performed by: INTERNAL MEDICINE

## 2017-08-19 PROCEDURE — 70553 MRI BRAIN STEM W/O & W/DYE: CPT

## 2017-08-19 RX ORDER — PRAVASTATIN SODIUM 20 MG/1
20 TABLET ORAL
Qty: 30 TAB | Refills: 3 | Status: SHIPPED | OUTPATIENT
Start: 2017-08-19 | End: 2018-02-13

## 2017-08-19 RX ORDER — SODIUM CHLORIDE 0.9 % (FLUSH) 0.9 %
10 SYRINGE (ML) INJECTION
Status: COMPLETED | OUTPATIENT
Start: 2017-08-19 | End: 2017-08-19

## 2017-08-19 RX ORDER — ASPIRIN 81 MG/1
162 TABLET ORAL DAILY
Qty: 60 TAB | Refills: 11 | Status: SHIPPED | OUTPATIENT
Start: 2017-08-19 | End: 2018-02-13

## 2017-08-19 RX ADMIN — LACTULOSE 10 G: 20 SOLUTION ORAL at 10:55

## 2017-08-19 RX ADMIN — Medication 10 ML: at 08:59

## 2017-08-19 RX ADMIN — CYANOCOBALAMIN TAB 1000 MCG 1000 MCG: 1000 TAB at 10:55

## 2017-08-19 RX ADMIN — Medication 5 ML: at 13:59

## 2017-08-19 RX ADMIN — LEVOTHYROXINE SODIUM 50 MCG: 50 TABLET ORAL at 06:36

## 2017-08-19 RX ADMIN — NAPROXEN 250 MG: 250 TABLET ORAL at 17:06

## 2017-08-19 RX ADMIN — SODIUM CHLORIDE 100 ML: 900 INJECTION, SOLUTION INTRAVENOUS at 08:59

## 2017-08-19 RX ADMIN — Medication 10 ML: at 06:36

## 2017-08-19 RX ADMIN — ASPIRIN 325 MG ORAL TABLET 325 MG: 325 PILL ORAL at 10:55

## 2017-08-19 RX ADMIN — GADOBENATE DIMEGLUMINE 20 ML: 529 INJECTION, SOLUTION INTRAVENOUS at 08:59

## 2017-08-19 RX ADMIN — NAPROXEN 250 MG: 250 TABLET ORAL at 10:56

## 2017-08-19 RX ADMIN — LACTULOSE 10 G: 20 SOLUTION ORAL at 17:06

## 2017-08-19 NOTE — PROGRESS NOTES
Problem: Mobility Impaired (Adult and Pediatric)  Goal: *Acute Goals and Plan of Care (Insert Text)  LTG:  (1.)Ms. Erika Cheek will move from supine to sit and sit to supine , scoot up and down and roll side to side in bed with INDEPENDENCE within 7 day(s). (2.)Ms. Erika Cheek will transfer from bed to chair and chair to bed with INDEPENDENCE using the least restrictive device within 7 day(s). (3.)Ms. Erika Cheek will ambulate with INDEPENDENCE for >sl=724 feet with the least restrictive device, appropriate gait pattern and good dynamic standing balance within 7 day(s). (4.)Ms. Erika Cheek will ascend/descend 3 steps with 1 handrail(s) and MODIFIED INDEPENDENCE within 7 day(s) for safe entry/exit of home.    ________________________________________________________________________________________________      PHYSICAL THERAPY: INITIAL ASSESSMENT 8/19/2017  INPATIENT: Hospital Day: 2  [place] / Product Type: Medicare /      NAME/AGE/GENDER: Rickie Wong is a [de-identified] y.o. female            PRIMARY DIAGNOSIS: Stroke due to embolism of left middle cerebral artery (HCC) <principal problem not specified> <principal problem not specified>        ICD-10: Treatment Diagnosis:       · Difficulty in walking, Not elsewhere classified (R26.2)  · History of falling (Z91.81)   Precaution/Allergies:  Crestor [rosuvastatin]; Gabapentin; Sulfa (sulfonamide antibiotics); and Zetia [ezetimibe]       ASSESSMENT:      Ms. Erika Cheek presents s/p embolism of left MCA. Pt's spouse currently here in hospital as well, preparing to go to rehab. Pt and spouse have a neighbor who is a great assistance to them both, doing yard work, helping with house cleaning, running errands, etc. No deficit noted in strength or coordination. Pt occasionally noted to be pleasantly confused.  Pt able to gait train 250 feet with SBA with occasional cues to focus on task at hand as she gets easily distracted, slight decrease in dynamic standing balance. Pt would benefit from an additional treatment to ensure safety and independence prior to discharge home. This section established at most recent assessment   PROBLEM LIST (Impairments causing functional limitations):  1. Decreased ADL/Functional Activities  2. Decreased Ambulation Ability/Technique  3. Decreased Balance    INTERVENTIONS PLANNED: (Benefits and precautions of physical therapy have been discussed with the patient.)  1. Balance Exercise  2. Family Education  3. Home Exercise Program (HEP)  4. Therapeutic Activites  5. Group Therapy      TREATMENT PLAN: Frequency/Duration: 3 times a week for duration of hospital stay  Rehabilitation Potential For Stated Goals: GOOD      RECOMMENDED REHABILITATION/EQUIPMENT: (at time of discharge pending progress): Continue Skilled Therapy and Home Health: Physical Therapy. HISTORY:   History of Present Injury/Illness (Reason for Referral):  Per chart: Patient is a [de-identified] y.o. female who presents with acute speech changes here in the hospital while checking on her  who is about to be discharged to rehab after fractured hip. She initially had garbled speech according to family and alos noted to have some confusion and unsteadiness on her feet. Initial head CT was negative for acute bleed or CVA. She also noted to have some left facial assymetry. She does take baby asa daily. She has had no palpitations and was recently checked by her cardiologist a Dr. Tammy Langston with heart monitor and this was negative. No recent echo. Past Medical History/Comorbidities:   Ms. Miri Max  has a past medical history of Dyslipidemia (7/25/2016); HTN (hypertension), benign (7/25/2016); Geno rhythm disorder (7/25/2016); Numbness and tingling (7/25/2016); Precordial pain (7/25/2016); and Syncope. Ms. Miri Max  has a past surgical history that includes partial hysterectomy.   Social History/Living Environment:   Home Environment: Private residence  # Steps to Enter: 3  Rails to Enter: Yes  Hand Rails : Left  Wheelchair Ramp: No  One/Two Story Residence: One story  Living Alone: No  Support Systems: Friends \ neighbors  Patient Expects to be Discharged to[de-identified] Private residence  Current DME Used/Available at Home: None  Tub or Shower Type: Shower  Prior Level of Function/Work/Activity:  Pt reports being independent with all functional mobility and gait without assistive device. Occasional falls. Drives. Takes care of spouse. Has a neighbor who is a great help. No family. Number of Personal Factors/Comorbidities that affect the Plan of Care: 1-2: MODERATE COMPLEXITY   EXAMINATION:   Most Recent Physical Functioning:   Gross Assessment:  AROM: Within functional limits  Strength: Within functional limits  Coordination: Within functional limits  Sensation: Impaired (decreased light touch B feet)               Posture:  Posture (WDL): Exceptions to WDL  Posture Assessment: Rounded shoulders  Balance:  Sitting: Intact  Standing: Impaired  Standing - Static: Good  Standing - Dynamic :  (fair plus) Bed Mobility:  Rolling: Independent  Supine to Sit: Supervision  Scooting: Supervision  Wheelchair Mobility:     Transfers:  Sit to Stand: Stand-by asssistance  Stand to Sit: Stand-by asssistance  Bed to Chair: Stand-by asssistance  Gait:     Base of Support: Narrowed  Speed/Amina:  (variable)  Distance (ft): 250 Feet (ft)  Assistive Device:  (none)  Ambulation - Level of Assistance: Stand-by asssistance       Body Structures Involved:  1. brain Body Functions Affected:  1. Neuromusculoskeletal Activities and Participation Affected:  1. General Tasks and Demands  2. Mobility  3.  Self Care   Number of elements that affect the Plan of Care: 4+: HIGH COMPLEXITY   CLINICAL PRESENTATION:   Presentation: Stable and uncomplicated: LOW COMPLEXITY   CLINICAL DECISION MAKIN [facility] [address] [id number] AM-PAC 6 Clicks   Basic Mobility Inpatient Short Form  How much difficulty does the patient currently have. .. Unable A Lot A Little None   1. Turning over in bed (including adjusting bedclothes, sheets and blankets)? [ ] 1   [ ] 2   [X] 3   [ ] 4   2. Sitting down on and standing up from a chair with arms ( e.g., wheelchair, bedside commode, etc.)   [ ] 1   [ ] 2   [X] 3   [ ] 4   3. Moving from lying on back to sitting on the side of the bed? [ ] 1   [ ] 2   [X] 3   [ ] 4   How much help from another person does the patient currently need. .. Total A Lot A Little None   4. Moving to and from a bed to a chair (including a wheelchair)? [ ] 1   [ ] 2   [X] 3   [ ] 4   5. Need to walk in hospital room? [ ] 1   [ ] 2   [X] 3   [ ] 4   6. Climbing 3-5 steps with a railing? [ ] 1   [ ] 2   [X] 3   [ ] 4   © 2007, Trustees of [address] [place] [address] [address] [id number], under license to [employer]. All rights reserved    Score:  Initial: 18 Most Recent: X (Date: -- )     Interpretation of Tool:  Represents activities that are increasingly more difficult (i.e. Bed mobility, Transfers, Gait). Score 24 23 22-20 19-15 14-10 9-7 6       Modifier CH CI CJ CK CL CM CN         · Mobility - Walking and Moving Around:               - CURRENT STATUS:    CK - 40%-59% impaired, limited or restricted               - GOAL STATUS:           CK - 40%-59% impaired, limited or restricted               - D/C STATUS:                       ---------------To be determined---------------  [place] / Product Type: Medicare /       Medical Necessity:     · Patient demonstrates good rehab potential due to higher previous functional level. Reason for Services/Other Comments:  · Patient continues to require present interventions due to patient's inability to function at baseline.    Use of outcome tool(s) and clinical judgement create a POC that gives a: Clear prediction of patient's progress: LOW COMPLEXITY                 TREATMENT:   (In addition to Assessment/Re-Assessment sessions the following treatments were rendered)   Pre-treatment Symptoms/Complaints:  \"I think I am just fine. \"  Pain: Initial: 0/10     Post Session:  0/10      Assessment/Reassessment only, no treatment provided today     Braces/Orthotics/Lines/Etc:   · O2 Device: Room air  Treatment/Session Assessment:    · Response to Treatment:  Tolerated treatment well. · Interdisciplinary Collaboration:  · Physical Therapist  · Registered Nurse  · After treatment position/precautions:  · Up in chair  · Bed/Chair-wheels locked  · Bed in low position  · Call light within reach  · RN notified  · Visitors at bedside  · Compliance with Program/Exercises: Will assess as treatment progresses. · Recommendations/Intent for next treatment session: \"Next visit will focus on advancements to more challenging activities and reduction in assistance provided\".   Total Treatment Duration:  PT Patient Time In/Time Out  Time In: 1100  Time Out: [place] 3, PT

## 2017-08-19 NOTE — PROGRESS NOTES
Pt has rested quietly during the 12-7a shift. oob with assistance to br, tolerated well. No weakness noted at this time. Pleasantly confusion noted at times, reoriented to surroundings. Telemetry intact. Sinus braday noted.

## 2017-08-19 NOTE — PROGRESS NOTES
Dual skin assessment with Altamease Meter, RN. Scattered moles to back. Sacrum intact, no redness. Heels blanchable. No other skin abnormalities noted.

## 2017-08-19 NOTE — PROGRESS NOTES
Problem: Falls - Risk of  Goal: *Absence of Falls  Document Shadi Fall Risk and appropriate interventions in the flowsheet.    Outcome: Progressing Towards Goal  Fall Risk Interventions:  Mobility Interventions: Bed/chair exit alarm, Communicate number of staff needed for ambulation/transfer, Patient to call before getting OOB     Mentation Interventions: Bed/chair exit alarm, Door open when patient unattended     Medication Interventions: Bed/chair exit alarm     Elimination Interventions: Bed/chair exit alarm, Call light in reach, Patient to call for help with toileting needs     History of Falls Interventions: Bed/chair exit alarm

## 2017-08-19 NOTE — DISCHARGE INSTRUCTIONS
Taking Aspirin to Prevent Heart Attack and Stroke: Care Instructions  Your Care Instructions  Aspirin acts as a \"blood thinner. \" It prevents blood clots from forming. When taken during and after a heart attack, it can reduce your chance of dying. And it's used if you have a stent in your coronary artery. Also, aspirin helps certain people lower their risk of a heart attack or stroke. Be sure you know what dose of aspirin to take and how often to take it. Low-dose aspirin is typically 81 mg. But the dose for daily aspirin can range from 81 mg to 325 mg. Taking aspirin every day can cause bleeding. It may not be safe if you have stomach ulcers. And it may not be safe if you have high blood pressure that is not controlled. If you take aspirin pills every day, do not take ones that have other ingredients such as caffeine or sodium. Before you start to take aspirin, tell your doctor all the medicines, vitamins, herbal products, and supplements you take. Follow-up care is a key part of your treatment and safety. Be sure to make and go to all appointments, and call your doctor if you are having problems. It's also a good idea to know your test results and keep a list of the medicines you take. How can you care for yourself at home? · Take aspirin with a full glass of water unless your doctor tells you not to. Do not lie down right after you take it. · If you have a stent in your coronary artery, take your aspirin as your heart doctor says to. If another doctor says to stop taking the aspirin for any reason, talk to your heart doctor before you stop. · Do not chew or crush the coated or sustained-release forms of aspirin. · Ask your doctor if you can drink alcohol while you take aspirin. And ask how much you can drink. Too much alcohol with aspirin can cause stomach bleeding. · Do not take aspirin if you are pregnant, unless your doctor says it is okay. · Keep all aspirin out of children's reach.   · Throw aspirin away if it starts to smell like vinegar. · Do not take aspirin if you have gout or if you take prescription blood thinners, unless your doctor has told you to. · Do not take prescription or over-the-counter medicines, vitamins, herbal products, or supplements without talking to your doctor first. [place] the label before you take another over-the-counter medicine. Many contain aspirin. So they could cause you to take too much aspirin. · Talk with your doctor before you take a pain medicine. Ask which type of medicine you can take and how to take it safely with aspirin. · Tell your doctor or dentist before a surgery or procedure that you take aspirin. He or she will tell you if you should stop taking aspirin before your surgery or procedure. Make sure that you understand exactly what your doctor wants you to do. Where can you learn more? Go to http://daniel-iam.info/. Enter G820 in the search box to learn more about \"Taking Aspirin to Prevent Heart Attack and Stroke: Care Instructions. \"  Current as of: April 3, 2017  Content Version: 11.3  © 4680-8584 [employer]. Care instructions adapted under license by [employer] (which disclaims liability or warranty for this information). If you have questions about a medical condition or this instruction, always ask your healthcare professional. [address] any warranty or liability for your use of this information. Learning About Antiplatelet Medicines After a Stroke  Introduction  If you have had a stroke, you may have concerns about having another one. You want to do all you can do to avoid this. If your stroke was caused by a blood clot, one of the best things you can do is to take antiplatelet medicines. They can help prevent another stroke. In most cases, you don't take them if you had a stroke caused by a leak in an artery. These medicines are often called blood thinners.  But they don't thin your blood. They work to keep platelets from sticking together and forming blood clots. (A platelet is a type of blood cell.) Blood clots can cause a stroke if they block a blood vessel in the brain. So by preventing blood clots, you are helping to prevent a stroke. Examples  · Aspirin (Kaiden, Bufferin, Ecotrin)  · Aspirin with dipyridamole (Aggrenox)  · Clopidogrel (Plavix)  Possible side effects  These medicines make your blood take longer than normal to clot. This can cause bleeding, and you may bruise easily. In rare cases, they can cause you to bleed inside your body without an injury. If you have an injury, you might have bleeding that is hard to control. These medicines may have other side effects. Depending on which one you take, you may:  · Have diarrhea. · Feel sick to your stomach. · Have a headache. · Have some mild belly pain. You may have other side effects or reactions not listed here. Check the information that comes with your medicine. What to know about taking this medicine  · Be sure you get instructions about how to take your medicine safely. Blood thinners can cause serious bleeding problems. · Be safe with medicines. Take your medicines exactly as prescribed. Call your doctor if you think you are having a problem with your medicine. · Check with your doctor or pharmacist before you use any other medicines, including over-the-counter medicines. Make sure your doctor knows all of the medicines, vitamins, herbal products, and supplements you take. Taking some medicines together can cause problems. Where can you learn more? Go to http://daniel-iam.info/. Enter C084 in the search box to learn more about \"Learning About Antiplatelet Medicines After a Stroke. \"  Current as of: November 17, 2016  Content Version: 11.3  © 3146-7833 [employer].  Care instructions adapted under license by [employer] (which disclaims liability or warranty for this information). If you have questions about a medical condition or this instruction, always ask your healthcare professional. [address] any warranty or liability for your use of this information. Bradycardia: Care Instructions  Your Care Instructions    Bradycardia is a slow heart rate. If your heart beats too slowly, it can't supply your body with enough blood. This can make you weak or dizzy. Or it may make you pass out. Sometimes medicine can cause this problem. If this happens, your doctor may have you adjust one of your medicines. If a medicine is not the problem, your doctor may recommend a pacemaker. It is important to treat bradycardia so that you don't get more serious health problems. Your doctor will want to see you on a routine schedule to make sure that your heartbeat is normal.  Follow-up care is a key part of your treatment and safety. Be sure to make and go to all appointments, and call your doctor if you are having problems. It's also a good idea to know your test results and keep a list of the medicines you take. How can you care for yourself at home? · Take your medicines exactly as prescribed. Call your doctor if you think you are having a problem with your medicine. If your bradycardia is caused by another disease, your doctor will try to treat the disease. If it is caused by heart medicines, he or she will adjust your medicines. · Make lifestyle changes to improve your heart health. ¨ To control your cholesterol, avoid foods with a lot of fat, saturated fat, or sodium. Try to eat more fiber. And if your doctor says it's okay, get some exercise on most days. ¨ Do not smoke. Smoking can make your heart condition worse. If you need help quitting, talk to your doctor about stop-smoking programs and medicines. These can increase your chances of quitting for good. ¨ Limit alcohol to 2 drinks a day for men and 1 drink a day for women.  Too much alcohol can cause health problems. Pacemaker  If you have a pacemaker, you will get more specific information about it. Be sure to:  · Check your pulse as your doctor tells you. · Have your pacemaker checked as often as your doctor recommends. You may be able to do this over the phone or computer. · Avoid strong magnetic or electrical fields. These include wand metal detectors used in airports, MRIs, welding equipment, and generators. · You will be checked several times right after you get your pacemaker and when it is time to have the battery changed. Batteries last for 5 to 15 years. · You can talk on a cell phone. But keep it 6 inches away from your pacemaker. · Microwaves, TVs, radios, and kitchen and bathroom appliances won't harm you. When should you call for help? Call 911 anytime you think you may need emergency care. For example, call if:  · You passed out (lost consciousness). · You have symptoms of a heart attack. These may include:  ¨ Chest pain or pressure, or a strange feeling in the chest.  ¨ Sweating. ¨ Shortness of breath. ¨ Nausea or vomiting. ¨ Pain, pressure, or a strange feeling in the back, neck, jaw, or upper belly or in one or both shoulders or arms. ¨ Lightheadedness or sudden weakness. ¨ A fast or irregular heartbeat. After you call 911, the  may tell you to chew 1 adult-strength or 2 to 4 low-dose aspirin. Wait for an ambulance. Do not try to drive yourself. · You have symptoms of a stroke. These may include:  ¨ Sudden numbness, tingling, weakness, or loss of movement in your face, arm, or leg, especially on only one side of your body. ¨ Sudden vision changes. ¨ Sudden trouble speaking. ¨ Sudden confusion or trouble understanding simple statements. ¨ Sudden problems with walking or balance. ¨ A sudden, severe headache that is different from past headaches.   Call your doctor now or seek immediate medical care if:  · You are dizzy or lightheaded, or you feel like you may faint.  · You have new or increased shortness of breath. · You had a pacemaker implanted and you have signs of infection, such as:  ¨ Increased pain, swelling, warmth, or redness. ¨ Red streaks leading from the cut (incision). ¨ Pus draining from the incision. ¨ A fever. Watch closely for changes in your health, and be sure to contact your doctor if you have any problems. Where can you learn more? Go to http://daniel-iam.info/. Enter X251 in the search box to learn more about \"Bradycardia: Care Instructions. \"  Current as of: April 3, 2017  Content Version: 11.3  © 4850-5785 [employer]. Care instructions adapted under license by [employer] (which disclaims liability or warranty for this information). If you have questions about a medical condition or this instruction, always ask your healthcare professional. [place] [address] any warranty or liability for your use of this information. Learning About Screening for Heart Attack and Stroke Risk  What is screening for heart attack and stroke risk? Screening for heart attack and stroke risk is a way for your doctor to check your chance of having a problem called atherosclerosis. This problem is also called hardening of the arteries. It is the starting point for most heart and blood flow problems, such as heart disease, stroke, and peripheral arterial disease. You and your doctor can use your risk score to decide if you want to take steps to lower your risk. How can you find out your risk? Your doctor looks at things that put you at risk for a heart attack and stroke. He or she might look at many things, such as:  · Your cholesterol levels. · Your blood pressure. · Your age. · Your race. · Whether you are male or female. · Whether or not you smoke. Your doctor might use a tool to calculate a risk score for you. There are different tools that doctors use.  They may show that your risk is higher or lower than it really is. But the tools give you and your doctor a good idea about your risk. What happens after screening? Knowing your risk can help you and your doctor talk about whether to take steps to lower your risk. A heart-healthy lifestyle is important for everyone. Some people also take medicine to lower their risk. You and your doctor can work together to decide what is best for you. Where can you learn more? Go to http://daniel-iam.info/. Enter F188 in the search box to learn more about \"Learning About Screening for Heart Attack and Stroke Risk. \"  Current as of: October 26, 2016  Content Version: 11.3  © 0353-8424 [employer]. Care instructions adapted under license by [employer] (which disclaims liability or warranty for this information). If you have questions about a medical condition or this instruction, always ask your healthcare professional. [place] [address] any warranty or liability for your use of this information. Stroke: After Your Visit     Your Care Instructions     You have had a stroke. Risk factors for stroke include being overweight, smoking, and sedentary lifestyle. This means that the blood flow to a part of your brain was blocked for some time, which damages the nerve cells in that part of the brain. The part of your body controlled by that part of your brain may not function properly now. The brain is an amazing organ that can heal itself to some degree. The stroke you had damaged part of your brain, but other parts of your brain may take over in some way for the damaged areas. You have already started this process. Going home may be hard for you and your family. The more you can try to do for yourself, the better. Remember to take each day one at a time. Follow-up care is a key part of your treatment and safety.  Be sure to make and go to all appointments, and call your doctor if you are having problems. Its also a good idea to know your test results and keep a list of the medicines you take. How can you care for yourself at home? Enter a stroke rehabilitation (rehab) program, if your doctor recommends it. Physical, speech, and occupational therapies can help you manage bathing, dressing, eating, and other basics of daily living. Eat a heart-healthy diet that is low in cholesterol, saturated fat, and salt. Eat lots of fresh fruits and vegetables and foods high in fiber. Increase your activities slowly. Take short rest breaks when you get tired. Gradually increase the amount you walk. Start out by walking a little more than you did the day before. Do not drive until your doctor says it is okay. It is normal to feel sad or depressed after a stroke. If the blues last, talk to your doctor. If you are having problems with urine leakage, go to the bathroom at regular times, including when you first wake up and at bedtime. Also, limit fluids after dinner. If you are constipated, drink plenty of fluids, enough so that your urine is light yellow or clear like water. If you have kidney, heart, or liver disease and have to limit fluids, talk with your doctor before you increase the amount of fluids you drink. Set up a regular time for using the toilet. If you continue to have constipation, your doctor may suggest using a bulking agent, such as Metamucil, or a stool softener, laxative, or enema. Medicines  Take your medicines exactly as prescribed. Call your doctor if you think you are having a problem with your medicine. You may be taking several medicines. ACE (angiotensin-converting enzyme) inhibitors, angiotensin II receptor blockers (ARBs), beta-blockers, diuretics (water pills), and calcium channel blockers control your blood pressure. Statins help lower cholesterol. Your doctor may also prescribe medicines for depression, pain, sleep problems, anxiety, or agitation.   If your doctor has given you medicine that prevents blood clots, such as warfarin (Coumadin), aspirin combined with extended-release dipyridamole (Aggrenox), clopidogrel (Plavix), or aspirin to prevent another stroke, you should:  Tell your dentist, pharmacist, and other health professionals that you take these medicines. Watch for unusual bruising or bleeding, such as blood in your urine, red or black stools, or bleeding from your nose or gums. Get regular blood tests to check your clotting time if you are taking Coumadin. Wear medical alert jewelry that says you take blood thinners. You can buy this at most [employer]. Do not take any over-the-counter medicines or herbal products without talking to your doctor first.  If you take birth control pills or hormone replacement therapy, talk to your doctor about whether they are right for you. For family members and caregivers  Make the home safe. Set up a room so that your loved one does not have to climb stairs. Be sure the bathroom is on the same floor. Move throw rugs and furniture that could cause falls, and make sure that the lighting is good. Put grab bars and seats in tubs and showers. Find out what your loved one can do and what he or she needs help with. Try not to do things for your loved one that your loved one can do on his or her own. Help him or her learn and practice new skills. Visit and talk with your loved one often. Try doing activities together that you both enjoy, such as playing cards or board games. Keep in touch with your loved one's friends as much as you can, and encourage them to visit. Take care of yourself. Do not try to do everything yourself. Ask other family members to help. Eat well, get enough rest, and take time to do things that you enjoy. Keep up with your own doctor visits, and make sure to take your medicines regularly. Get out of the house as much as you can. Join a local support group.  Find out if you qualify for home health care visits to help with rehab or for adult day care. When should you call for help? Call 911 anytime you think you may need emergency care. For example, call if:  You have signs of another stroke. These may include:  Sudden numbness, paralysis, or weakness in your face, arm, or leg, especially on only one side of your body. New problems with walking or balance. Sudden vision changes. Drooling or slurred speech. New problems speaking or understanding simple statements, or you feel confused. A sudden, severe headache that is different from past headaches. Call 911 even if these symptoms go away in a few minutes. You cough up blood. You vomit blood or what looks like coffee grounds. You pass maroon or very bloody stools. Call your doctor now or seek immediate medical care if:  You have new bruises or blood spots under your skin. You have a nosebleed. Your gums bleed when you brush your teeth. You have blood in your urine. Your stools are black and tarlike or have streaks of blood. You have vaginal bleeding when you are not having your period, or heavy period bleeding. You have new symptoms that may be related to your stroke, such as falls or trouble swallowing. Watch closely for changes in your health, and be sure to contact your doctor if you have any problems. Where can you learn more? Go to MAZ.be    Enter C294  in the search box to learn more about \"Stroke: After Your Visit\". © 8907-0945 [employer]. Care instructions adapted under license by Ginette Bowers (which disclaims liability or warranty for this information). This care instruction is for use with your licensed healthcare professional. If you have questions about a medical condition or this instruction, always ask your healthcare professional. [place] any warranty or liability for your use of this information.

## 2017-08-19 NOTE — DISCHARGE SUMMARY
Hospitalist Discharge Summary     Patient ID:  Cody Dixon  [id number]  27 y.o.  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 In summary, the patient Patient is a [de-identified] y.o. female who presents with acute speech changes here in the hospital while checking on her  who is about to be discharged to rehab after fractured hip. She initially had garbled speech according to family and alos noted to have some confusion and unsteadiness on her feet. Initial head CT was negative for acute bleed or CVA. She also noted to have some left facial assymetry. She does take baby asa daily. She has had no palpitations and was recently checked by her cardiologist a Dr. Song Pang with heart monitor and this was negative. No recent echo. We did the usual workup to identify a stroke lesion and the MRI was negative for lesion. There was no evidence of arrhythmia or clot on echo. Her symptoms have essentially resolved. Therefore it is felt that she can be discharged  And follow up with primary care.      Significant Diagnostic Studies:       Labs: Results:       Chemistry Recent Labs      08/18/17   1530   GLU  91   NA  144   K  3.6   CL  108*   CO2  30   BUN  17   CREA  0.76   CA  9.1   AGAP  6*   AP  57   TP  7.0   ALB  3.6   GLOB  3.4   AGRAT  1.1*      CBC w/Diff Recent Labs      08/18/17   1530   WBC  7.9   RBC  4.35   HGB  13.6   HCT  39.8   PLT  250   GRANS  52   LYMPH  34   EOS  4      Cardiac Enzymes No results for input(s): CPK, CKND1, MALINDA in the last 72 hours. No lab exists for component: CKRMB, TROIP   Coagulation Recent Labs      08/18/17   1530  08/18/17   1529   PTP  10.8   --    INR  1.0  1.1   APTT  26.1   --        Lipid Panel Lab Results   Component Value Date/Time    Cholesterol, total 142 08/19/2017 05:36 AM    HDL Cholesterol 34 08/19/2017 05:36 AM    LDL, calculated 82 08/19/2017 05:36 AM    VLDL, calculated 26 08/19/2017 05:36 AM    Triglyceride 130 08/19/2017 05:36 AM    CHOL/HDL Ratio 4.2 08/19/2017 05:36 AM      BNP No results for input(s): BNPP in the last 72 hours. Liver Enzymes Recent Labs      08/18/17   1530   TP  7.0   ALB  3.6   AP  57   SGOT  18      Thyroid Studies Lab Results   Component Value Date/Time    TSH 1.920 07/26/2016 12:02 PM            Discharge Exam:  Visit Vitals    /87 (BP 1 Location: Right arm, BP Patient Position: Sitting)    Pulse 63    Temp 98 °F (36.7 °C)    Resp 19    Ht 5' 6\" (1.676 m)    Wt 65.8 kg (145 lb)    SpO2 98%    BMI 22.05 kg/m2     General appearance: alert, cooperative, no distress, appears stated age  Lungs: clear to auscultation bilaterally  Heart: regular rate and rhythm, S1, S2 normal, no murmur, click, rub or gallop  Abdomen: soft, non-tender. Bowel sounds normal. No masses,  no organomegaly  Extremities: no cyanosis or edema  Neurologic: Grossly normal    Disposition:  Discharge Condition: stable  Patient Instructions:   Current Discharge Medication List      START taking these medications    Details   pravastatin (PRAVACHOL) 20 mg tablet Take 1 Tab by mouth nightly. Qty: 30 Tab, Refills: 3         CONTINUE these medications which have CHANGED    Details   aspirin delayed-release 81 mg tablet Take 2 Tabs by mouth daily. Qty: 60 Tab, Refills: 11         CONTINUE these medications which have NOT CHANGED    Details   cholecalciferol, VITAMIN D3, (VITAMIN D3) 5,000 unit tab tablet Take  by mouth daily.       naproxen sodium 220 mg cap Take  by mouth.      vitamin E (AQUA GEMS) 400 unit capsule Take  by mouth daily. cyanocobalamin (VITAMIN B-12) 1,000 mcg sublingual tablet Take 1,000 mcg by mouth daily. levothyroxine (SYNTHROID) 50 mcg tablet Take  by mouth Daily (before breakfast). lactulose (CHRONULAC) 10 gram/15 mL solution Take  by mouth three (3) times daily. ALPRAZolam (XANAX) 0.25 mg tablet Take  by mouth.      meclizine (ANTIVERT) 25 mg tablet Take  by mouth three (3) times daily as needed. Activity:  Diet:  Wound Care:     Follow-up with primary physician  ·     Time spent to discharge patient 35 minutes  Signed:  Rodrigo Mcfarland MD  8/19/2017  6:05 PM

## 2017-08-19 NOTE — PROGRESS NOTES
Problem: Dysphagia (Adult)  Goal: Interventions  SPEECH LANGUAGE PATHOLOGY: BEDSIDE SWALLOW NOTE: INITIAL ASSESSMENT AND DISCHARGE     NAME/AGE/GENDER: Fiona Lehman is a [de-identified] y.o. female  DATE: 8/19/2017  PRIMARY DIAGNOSIS: Stroke due to embolism of left middle cerebral artery (HCC)       ICD-10: Treatment Diagnosis: R13.10 dysphagia unspecified  INTERDISCIPLINARY COLLABORATION: Registered Nurse  PRECAUTIONS/ALLERGIES: Crestor [rosuvastatin]; Gabapentin; Sulfa (sulfonamide antibiotics); and Zetia [ezetimibe]   ASSESSMENT:   Based on the objective data described below, Ms. Daniela Ibarra presents with an essentially normal oropharyngeal swallow function and no evidence or report of speech/language deficits. Pt sitting upright in chair and tolerating thin liquids, soft solids, and regular solids without overt signs/symptoms of laryngeal penetration/aspiration as evidenced by timely swallows without cough response and no change in clear/dry vocal quality. Pt and family member present report no speech or language deficits this date. Pt conversing with SLP without deficits noted by SLP. No further skilled SLP intervention warranted at this time, thank you for this referral.  Recommend continue diet as ordered. ?????? ? ? This section established at most recent assessment??????????  PROBLEM LIST (Impairments causing functional limitations): 1. none  REHABILITATION POTENTIAL FOR STATED GOALS: EXCELLENT      PLAN OF CARE:   Patient will benefit from skilled intervention to address the following impairments. RECOMMENDATIONS AND PLANNED INTERVENTIONS (Benefits and precautions of therapy have been discussed with the patient.):  · continue prescribed diet  MEDICATIONS:  · With liquid  · Pt reports some difficulty swallow medications: recommend cut large pills in half, place meds whole in puree, or crush meds as deemed appropriate   RECOMMENDED REHABILITATION/EQUIPMENT: (at time of discharge pending progress):   None.       SUBJECTIVE: Pt calm and cooperative sitting upright in chair eating breakfast.  History of Present Injury/Illness: Ms. 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 She also  has a past surgical history that includes partial hysterectomy. Present Symptoms:    Pain Intensity 1: 0  Pain Location 1: Head  Current Medications:   No current facility-administered medications on file prior to encounter. Current Outpatient Prescriptions on File Prior to Encounter   Medication Sig Dispense Refill    cholecalciferol, VITAMIN D3, (VITAMIN D3) 5,000 unit tab tablet Take  by mouth daily.  naproxen sodium 220 mg cap Take  by mouth.  vitamin E (AQUA GEMS) 400 unit capsule Take  by mouth daily.  cyanocobalamin (VITAMIN B-12) 1,000 mcg sublingual tablet Take 1,000 mcg by mouth daily.  aspirin delayed-release 81 mg tablet Take  by mouth daily.  levothyroxine (SYNTHROID) 50 mcg tablet Take  by mouth Daily (before breakfast).  lactulose (CHRONULAC) 10 gram/15 mL solution Take  by mouth three (3) times daily.  ALPRAZolam (XANAX) 0.25 mg tablet Take  by mouth.  meclizine (ANTIVERT) 25 mg tablet Take  by mouth three (3) times daily as needed.          Current Dietary Status:  Regular/thin liquids           History of reflux:  NO   · Reflux medication:n/a  Social History/Home Situation:    Home Environment: Private residence  # Steps to Enter: 3  Rails to Enter: Yes  Hand Rails : Left  Wheelchair Ramp: No  One/Two Story Residence: One story  Living Alone: No  Support Systems: Friends \ neighbors  Patient Expects to be Discharged to[de-identified] Private residence  Current DME Used/Available at Home: None  Tub or Shower Type: Shower      OBJECTIVE:   Respiratory Status:  Room air     CXR Results:negative for acute abnormality  MRI/CT Results:negative  Oral Motor Structure/Speech:  Oral-Motor Structure/Motor Speech  Labial: No impairment  Dentition: Full  Oral Hygiene: adequate  Lingual: No impairment     Cognitive and Communication Status:  Neurologic State: Alert  Orientation Level: Oriented X4  Cognition: Follows commands  Perception: Appears intact  Perseveration: No perseveration noted  Safety/Judgement: Awareness of environment     BEDSIDE SWALLOW EVALUATION  Oral Assessment:  Oral Assessment  Labial: No impairment  Dentition: Full  Oral Hygiene: adequate  Lingual: No impairment  P.O. Trials:  Patient Position: upright in chair     The patient was given bite/sip amounts of the following:   Consistency Presented: Thin liquid;Mechanical soft; Solid  How Presented: Self-fed/presented;Cup/sip;Straw;Successive swallows     ORAL PHASE:  Bolus Acceptance: No impairment  Bolus Formation/Control: No impairment  Propulsion: No impairment     Oral Residue: None     PHARYNGEAL PHASE:  Initiation of Swallow: No impairment  Laryngeal Elevation: Functional  Aspiration Signs/Symptoms: None  Vocal Quality: No impairment           Pharyngeal Phase Characteristics: No impairment, issues, or problems      OTHER OBSERVATIONS:  Rate/bite size: WNL         Endurance:   WNL               Tool Used: Dysphagia Outcome and Severity Scale (LOYDA)     Score Comments   Normal Diet  [X] 7 With no strategies or extra time needed   Functional Swallow  [ ] 6 May have mild oral or pharyngeal delay         Mild Dysphagia     [ ] 5 Which may require one diet consistency restricted (those who demonstrate penetration which is entirely cleared on MBS would be included)   Mild-Moderate Dysphagia  [ ] 4 With 1-2 diet consistencies restricted         Moderate Dysphagia  [ ] 3 With 2 or more diet consistencies restricted         Moderately Severe Dysphagia  [ ] 2 With partial PO strategies (trials with ST only)         Severe Dysphagia  [ ] 1 With inability to tolerate any PO safely            Score:  Initial: 7 Most Recent: X (Date: -- )   Interpretation of Tool: The Dysphagia Outcome and Severity Scale (LOYDA) is a simple, easy-to-use, 7-point scale developed to systematically rate the functional severity of dysphagia based on objective assessment and make recommendations for diet level, independence level, and type of nutrition. Score 7 6 5 4 3 2 1   Modifier CH CI CJ CK CL CM CN   · Swallowing:               - CURRENT STATUS:           CH - 0% impaired, limited or restricted               - GOAL STATUS:                   CH - 0% impaired, limited or restricted               - D/C STATUS:                       CH - 0% impaired, limited or restricted  [place] / [place] / Product Type: Medicare /       TREATMENT:         (In addition to Assessment/Re-Assessment sessions the following treatments were rendered)  Assessment/Reassessment only, no treatment provided today  MODALITIES:                                                                     ORAL MOTOR  EXERCISES:                                                                                                                                                                       LARYNGEAL / PHARYNGEAL EXERCISES:                                                                                                                                      __________________________________________________________________________________________________  Safety:   After treatment position/precautions:  · Up in chair  · Family at bedside  Progression/Medical Necessity:   · Skilled SLP intervention not warranted at this time. Pt consuming regular diet and thin liquids without signs of dysphagia and, per pt/family, pt with no speech/language deficits.      Total Treatment Duration:  Time In: 1124  Time Out: [address] [place], [place], [facility]

## 2017-09-05 ENCOUNTER — HOSPITAL ENCOUNTER (OUTPATIENT)
Dept: ULTRASOUND IMAGING | Age: 82
Discharge: HOME | End: 2017-09-05
Attending: FAMILY MEDICINE
Payer: MEDICARE

## 2017-09-05 DIAGNOSIS — G45.9 BRAIN TIA: ICD-10-CM

## 2017-09-05 PROCEDURE — 93880 EXTRACRANIAL BILAT STUDY: CPT

## 2018-02-09 ENCOUNTER — HOSPITAL ENCOUNTER (OUTPATIENT)
Dept: LAB | Age: 83
Discharge: HOME | End: 2018-02-09
Payer: MEDICARE

## 2018-02-09 DIAGNOSIS — E78.5 DYSLIPIDEMIA: ICD-10-CM

## 2018-02-09 LAB
ALBUMIN SERPL-MCNC: 4 G/DL (ref 3.2–4.6)
ALBUMIN/GLOB SERPL: 1.3 {RATIO}
ALP SERPL-CCNC: 61 U/L (ref 50–136)
ALT SERPL-CCNC: 15 U/L (ref 12–65)
ANION GAP SERPL CALC-SCNC: 5 MMOL/L
AST SERPL-CCNC: 17 U/L (ref 15–37)
BASOPHILS # BLD: 0 K/UL (ref 0–0.2)
BASOPHILS NFR BLD: 0 % (ref 0–2)
BILIRUB SERPL-MCNC: 0.7 MG/DL (ref 0.2–1.1)
BUN SERPL-MCNC: 13 MG/DL (ref 8–23)
CALCIUM SERPL-MCNC: 8.9 MG/DL (ref 8.3–10.4)
CHLORIDE SERPL-SCNC: 105 MMOL/L (ref 98–107)
CHOLEST SERPL-MCNC: 175 MG/DL
CO2 SERPL-SCNC: 30 MMOL/L (ref 21–32)
CREAT SERPL-MCNC: 0.8 MG/DL (ref 0.6–1)
DIFFERENTIAL METHOD BLD: ABNORMAL
EOSINOPHIL # BLD: 0.1 K/UL (ref 0–0.8)
EOSINOPHIL NFR BLD: 1 % (ref 0.5–7.8)
ERYTHROCYTE [DISTWIDTH] IN BLOOD BY AUTOMATED COUNT: 12.8 % (ref 11.9–14.6)
GLOBULIN SER CALC-MCNC: 3.2 G/DL
GLUCOSE SERPL-MCNC: 89 MG/DL (ref 65–100)
HCT VFR BLD AUTO: 41 % (ref 35.8–46.3)
HDLC SERPL-MCNC: 57 MG/DL (ref 40–60)
HDLC SERPL: 3.1 {RATIO}
HGB BLD-MCNC: 13.8 G/DL (ref 11.7–15.4)
LDLC SERPL CALC-MCNC: 102.6 MG/DL
LIPID PROFILE,FLP: NORMAL
LYMPHOCYTES # BLD: 1.8 K/UL (ref 0.5–4.6)
LYMPHOCYTES NFR BLD: 23 % (ref 13–44)
MCH RBC QN AUTO: 31.5 PG (ref 26.1–32.9)
MCHC RBC AUTO-ENTMCNC: 33.7 G/DL (ref 31.4–35)
MCV RBC AUTO: 93.6 FL (ref 79.6–97.8)
MONOCYTES # BLD: 0.9 K/UL (ref 0.1–1.3)
MONOCYTES NFR BLD: 11 % (ref 4–12)
NEUTS SEG # BLD: 5.2 K/UL (ref 1.7–8.2)
NEUTS SEG NFR BLD: 65 % (ref 43–78)
PLATELET # BLD AUTO: 239 K/UL (ref 150–450)
PMV BLD AUTO: 8.8 FL (ref 10.8–14.1)
POTASSIUM SERPL-SCNC: 3.9 MMOL/L (ref 3.5–5.1)
PROT SERPL-MCNC: 7.2 G/DL (ref 6.3–8.2)
RBC # BLD AUTO: 4.38 M/UL (ref 4.05–5.25)
SODIUM SERPL-SCNC: 140 MMOL/L (ref 136–145)
TRIGL SERPL-MCNC: 77 MG/DL (ref 35–150)
VLDLC SERPL CALC-MCNC: 15.4 MG/DL (ref 6–23)
WBC # BLD AUTO: 8 K/UL (ref 4.3–11.1)

## 2018-02-09 PROCEDURE — 80053 COMPREHEN METABOLIC PANEL: CPT | Performed by: INTERNAL MEDICINE

## 2018-02-09 PROCEDURE — 36415 COLL VENOUS BLD VENIPUNCTURE: CPT | Performed by: INTERNAL MEDICINE

## 2018-02-09 PROCEDURE — 85025 COMPLETE CBC W/AUTO DIFF WBC: CPT | Performed by: INTERNAL MEDICINE

## 2018-02-09 PROCEDURE — 80061 LIPID PANEL: CPT | Performed by: INTERNAL MEDICINE

## 2018-06-08 PROBLEM — Z88.7 ALLERGY TO PNEUMONIA VACCINE: Chronic | Status: ACTIVE | Noted: 2018-06-08

## 2018-06-08 PROBLEM — E03.9 ACQUIRED HYPOTHYROIDISM: Chronic | Status: ACTIVE | Noted: 2018-06-08

## 2018-06-08 PROBLEM — I63.412 STROKE DUE TO EMBOLISM OF LEFT MIDDLE CEREBRAL ARTERY (HCC): Chronic | Status: ACTIVE | Noted: 2017-08-18

## 2018-06-15 ENCOUNTER — APPOINTMENT (RX ONLY)
Dept: URBAN - METROPOLITAN AREA CLINIC 23 | Facility: CLINIC | Age: 83
Setting detail: DERMATOLOGY
End: 2018-06-15

## 2018-06-15 DIAGNOSIS — D18.0 HEMANGIOMA: ICD-10-CM

## 2018-06-15 DIAGNOSIS — L82.1 OTHER SEBORRHEIC KERATOSIS: ICD-10-CM

## 2018-06-15 DIAGNOSIS — L57.0 ACTINIC KERATOSIS: ICD-10-CM

## 2018-06-15 PROBLEM — L29.8 OTHER PRURITUS: Status: ACTIVE | Noted: 2018-06-15

## 2018-06-15 PROBLEM — D18.01 HEMANGIOMA OF SKIN AND SUBCUTANEOUS TISSUE: Status: ACTIVE | Noted: 2018-06-15

## 2018-06-15 PROBLEM — L85.3 XEROSIS CUTIS: Status: ACTIVE | Noted: 2018-06-15

## 2018-06-15 PROCEDURE — ? LIQUID NITROGEN

## 2018-06-15 PROCEDURE — 99212 OFFICE O/P EST SF 10 MIN: CPT | Mod: 25

## 2018-06-15 PROCEDURE — ? COUNSELING

## 2018-06-15 PROCEDURE — 17003 DESTRUCT PREMALG LES 2-14: CPT

## 2018-06-15 PROCEDURE — 17000 DESTRUCT PREMALG LESION: CPT

## 2018-06-15 ASSESSMENT — LOCATION DETAILED DESCRIPTION DERM
LOCATION DETAILED: LEFT DISTAL POSTERIOR UPPER ARM
LOCATION DETAILED: LEFT MEDIAL SUPERIOR CHEST
LOCATION DETAILED: RIGHT DISTAL POSTERIOR UPPER ARM
LOCATION DETAILED: RIGHT MEDIAL UPPER BACK
LOCATION DETAILED: NASAL DORSUM

## 2018-06-15 ASSESSMENT — LOCATION ZONE DERM
LOCATION ZONE: TRUNK
LOCATION ZONE: NOSE
LOCATION ZONE: ARM

## 2018-06-15 ASSESSMENT — LOCATION SIMPLE DESCRIPTION DERM
LOCATION SIMPLE: NOSE
LOCATION SIMPLE: CHEST
LOCATION SIMPLE: RIGHT UPPER BACK
LOCATION SIMPLE: RIGHT POSTERIOR UPPER ARM
LOCATION SIMPLE: LEFT POSTERIOR UPPER ARM

## 2018-06-15 NOTE — PROCEDURE: COUNSELING
Detail Level: Zone Airway patent, Nasal mucosa clear. Mouth with normal mucosa. Throat has no vesicles, no oropharyngeal exudates and uvula is midline.

## 2018-08-13 ENCOUNTER — APPOINTMENT (RX ONLY)
Dept: URBAN - METROPOLITAN AREA CLINIC 23 | Facility: CLINIC | Age: 83
Setting detail: DERMATOLOGY
End: 2018-08-13

## 2018-08-13 DIAGNOSIS — L82.0 INFLAMED SEBORRHEIC KERATOSIS: ICD-10-CM

## 2018-08-13 DIAGNOSIS — L57.0 ACTINIC KERATOSIS: ICD-10-CM

## 2018-08-13 PROBLEM — M12.9 ARTHROPATHY, UNSPECIFIED: Status: ACTIVE | Noted: 2018-08-13

## 2018-08-13 PROCEDURE — 99212 OFFICE O/P EST SF 10 MIN: CPT | Mod: 25

## 2018-08-13 PROCEDURE — ? OTHER

## 2018-08-13 PROCEDURE — ? COUNSELING

## 2018-08-13 PROCEDURE — ? LIQUID NITROGEN

## 2018-08-13 PROCEDURE — 17110 DESTRUCTION B9 LES UP TO 14: CPT

## 2018-08-13 ASSESSMENT — LOCATION DETAILED DESCRIPTION DERM
LOCATION DETAILED: RIGHT INFERIOR CENTRAL MALAR CHEEK
LOCATION DETAILED: NASAL DORSUM

## 2018-08-13 ASSESSMENT — LOCATION ZONE DERM
LOCATION ZONE: FACE
LOCATION ZONE: NOSE

## 2018-08-13 ASSESSMENT — LOCATION SIMPLE DESCRIPTION DERM
LOCATION SIMPLE: NOSE
LOCATION SIMPLE: RIGHT CHEEK

## 2018-08-13 NOTE — PROCEDURE: LIQUID NITROGEN
Medical Necessity Information: It is in your best interest to select a reason for this procedure from the list below. All of these items fulfill various CMS LCD requirements except the new and changing color options.
Medical Necessity Clause: This procedure was medically necessary because the lesions that were treated were:irritated
Consent: The patient's consent was obtained including but not limited to risks of crusting, scabbing, blistering, scarring, darker or lighter pigmentary change, recurrence, incomplete removal and infection.
Include Z78.9 (Other Specified Conditions Influencing Health Status) As An Associated Diagnosis?: No
Detail Level: Detailed
Post-Care Instructions: I reviewed with the patient in detail post-care instructions. Patient is to wear sunprotection, and avoid picking at any of the treated lesions. Pt may apply Vaseline to crusted or scabbing areas.

## 2018-08-13 NOTE — PROCEDURE: OTHER
Detail Level: Simple
Other (Free Text): Resolved with LN and no further treatment necessary
Note Text (......Xxx Chief Complaint.): This diagnosis correlates with the

## 2018-09-19 ENCOUNTER — HOSPITAL ENCOUNTER (OUTPATIENT)
Dept: ULTRASOUND IMAGING | Age: 83
Discharge: HOME | End: 2018-09-19
Attending: FAMILY MEDICINE
Payer: MEDICARE

## 2018-09-19 DIAGNOSIS — R09.89 LEFT CAROTID BRUIT: ICD-10-CM

## 2018-09-19 PROCEDURE — 93880 EXTRACRANIAL BILAT STUDY: CPT

## 2018-09-26 NOTE — PROGRESS NOTES
Pt advised and sts she is finished with her Abx for uti but sts she is still having the same problem and wanted to know if she needed more med or just wait.

## 2019-02-26 PROBLEM — G45.9 TIA (TRANSIENT ISCHEMIC ATTACK): Status: RESOLVED | Noted: 2017-08-19 | Resolved: 2019-02-26

## 2019-02-26 PROBLEM — F41.9 ANXIETY: Status: ACTIVE | Noted: 2019-02-26

## 2019-03-20 PROBLEM — I10 BENIGN ESSENTIAL HTN: Chronic | Status: ACTIVE | Noted: 2019-03-20

## 2019-03-20 PROBLEM — F41.9 ANXIETY: Chronic | Status: ACTIVE | Noted: 2019-02-26

## 2019-03-27 ENCOUNTER — APPOINTMENT (RX ONLY)
Dept: URBAN - METROPOLITAN AREA CLINIC 23 | Facility: CLINIC | Age: 84
Setting detail: DERMATOLOGY
End: 2019-03-27

## 2019-03-27 DIAGNOSIS — L82.0 INFLAMED SEBORRHEIC KERATOSIS: ICD-10-CM

## 2019-03-27 DIAGNOSIS — L57.0 ACTINIC KERATOSIS: ICD-10-CM

## 2019-03-27 DIAGNOSIS — L81.4 OTHER MELANIN HYPERPIGMENTATION: ICD-10-CM

## 2019-03-27 PROBLEM — F32.9 MAJOR DEPRESSIVE DISORDER, SINGLE EPISODE, UNSPECIFIED: Status: ACTIVE | Noted: 2019-03-27

## 2019-03-27 PROBLEM — I10 ESSENTIAL (PRIMARY) HYPERTENSION: Status: ACTIVE | Noted: 2019-03-27

## 2019-03-27 PROCEDURE — 99213 OFFICE O/P EST LOW 20 MIN: CPT | Mod: 25

## 2019-03-27 PROCEDURE — 17110 DESTRUCTION B9 LES UP TO 14: CPT

## 2019-03-27 PROCEDURE — ? COUNSELING

## 2019-03-27 PROCEDURE — 17003 DESTRUCT PREMALG LES 2-14: CPT

## 2019-03-27 PROCEDURE — ? LIQUID NITROGEN

## 2019-03-27 PROCEDURE — 17000 DESTRUCT PREMALG LESION: CPT | Mod: 59

## 2019-03-27 ASSESSMENT — LOCATION DETAILED DESCRIPTION DERM
LOCATION DETAILED: RIGHT CENTRAL ZYGOMA
LOCATION DETAILED: LEFT CENTRAL ZYGOMA
LOCATION DETAILED: LEFT DISTAL POSTERIOR UPPER ARM
LOCATION DETAILED: RIGHT LATERAL MALAR CHEEK
LOCATION DETAILED: LEFT ULNAR DORSAL HAND

## 2019-03-27 ASSESSMENT — LOCATION SIMPLE DESCRIPTION DERM
LOCATION SIMPLE: RIGHT CHEEK
LOCATION SIMPLE: LEFT ZYGOMA
LOCATION SIMPLE: RIGHT ZYGOMA
LOCATION SIMPLE: LEFT HAND
LOCATION SIMPLE: LEFT POSTERIOR UPPER ARM

## 2019-03-27 ASSESSMENT — LOCATION ZONE DERM
LOCATION ZONE: HAND
LOCATION ZONE: ARM
LOCATION ZONE: FACE

## 2019-03-27 NOTE — PROCEDURE: LIQUID NITROGEN
Post-Care Instructions: I reviewed with the patient in detail post-care instructions. Patient is to wear sunprotection, and avoid picking at any of the treated lesions. Pt may apply Vaseline to crusted or scabbing areas.
Consent: The patient's consent was obtained including but not limited to risks of crusting, scabbing, blistering, scarring, darker or lighter pigmentary change, recurrence, incomplete removal and infection.
Medical Necessity Clause: This procedure was medically necessary because the lesions that were treated were:irritated
Render Post-Care Instructions In Note?: no
Duration Of Freeze Thaw-Cycle (Seconds): 0
Medical Necessity Information: It is in your best interest to select a reason for this procedure from the list below. All of these items fulfill various CMS LCD requirements except the new and changing color options.
Detail Level: Detailed

## 2019-09-06 ENCOUNTER — HOSPITAL ENCOUNTER (OUTPATIENT)
Dept: ULTRASOUND IMAGING | Age: 84
Discharge: HOME | End: 2019-09-06
Attending: FAMILY MEDICINE
Payer: MEDICARE

## 2019-09-06 DIAGNOSIS — R09.89 LEFT CAROTID BRUIT: ICD-10-CM

## 2019-09-06 PROCEDURE — 93880 EXTRACRANIAL BILAT STUDY: CPT

## 2019-10-02 PROBLEM — R73.01 IFG (IMPAIRED FASTING GLUCOSE): Status: ACTIVE | Noted: 2019-10-02

## 2019-10-02 PROBLEM — F43.21 GRIEF REACTION: Status: ACTIVE | Noted: 2019-10-02

## 2022-03-18 PROBLEM — I63.412 STROKE DUE TO EMBOLISM OF LEFT MIDDLE CEREBRAL ARTERY (HCC): Status: ACTIVE | Noted: 2017-08-18

## 2022-03-18 PROBLEM — I10 BENIGN ESSENTIAL HTN: Status: ACTIVE | Noted: 2019-03-20

## 2022-03-19 PROBLEM — Z88.7 ALLERGY TO PNEUMONIA VACCINE: Status: ACTIVE | Noted: 2018-06-08

## 2022-03-19 PROBLEM — R73.01 IFG (IMPAIRED FASTING GLUCOSE): Status: ACTIVE | Noted: 2019-10-02

## 2022-03-19 PROBLEM — F43.21 GRIEF REACTION: Status: ACTIVE | Noted: 2019-10-02

## 2022-03-19 PROBLEM — F41.9 ANXIETY: Status: ACTIVE | Noted: 2019-02-26

## 2022-03-20 PROBLEM — E03.9 ACQUIRED HYPOTHYROIDISM: Status: ACTIVE | Noted: 2018-06-08

## 2023-06-12 PROBLEM — I48.91 NEW ONSET ATRIAL FIBRILLATION (HCC): Status: ACTIVE | Noted: 2023-01-01

## 2023-06-12 PROBLEM — Z66 DNR (DO NOT RESUSCITATE): Chronic | Status: ACTIVE | Noted: 2023-06-12

## 2023-06-12 PROBLEM — E03.9 ACQUIRED HYPOTHYROIDISM: Status: ACTIVE | Noted: 2018-06-08

## 2023-06-12 PROBLEM — I26.99 PULMONARY EMBOLUS, RIGHT (HCC): Status: ACTIVE | Noted: 2023-01-01

## 2023-06-12 PROBLEM — S22.020A COMPRESSION FRACTURE OF T2 VERTEBRA (HCC): Chronic | Status: ACTIVE | Noted: 2023-01-01

## 2023-06-12 PROBLEM — F03.90 DEMENTIA (HCC): Chronic | Status: ACTIVE | Noted: 2023-06-12

## 2023-06-12 PROBLEM — I10 PRIMARY HYPERTENSION: Status: ACTIVE | Noted: 2019-03-20

## 2023-06-12 PROBLEM — M80.08XA AGE-RELATED OSTEOPOROSIS WITH CURRENT PATHOL FRACTURE OF VERTEBRA (HCC): Chronic | Status: ACTIVE | Noted: 2023-06-12

## 2023-06-12 PROBLEM — N18.32 STAGE 3B CHRONIC KIDNEY DISEASE (HCC): Chronic | Status: ACTIVE | Noted: 2023-01-01

## 2023-07-20 ENCOUNTER — APPOINTMENT (OUTPATIENT)
Dept: GENERAL RADIOLOGY | Age: 88
End: 2023-07-20
Payer: MEDICARE

## 2023-07-20 ENCOUNTER — HOSPITAL ENCOUNTER (EMERGENCY)
Age: 88
Discharge: HOME | End: 2023-07-20
Attending: EMERGENCY MEDICINE
Payer: MEDICARE

## 2023-07-20 VITALS
SYSTOLIC BLOOD PRESSURE: 108 MMHG | DIASTOLIC BLOOD PRESSURE: 67 MMHG | RESPIRATION RATE: 20 BRPM | HEIGHT: 67 IN | BODY MASS INDEX: 19.3 KG/M2 | WEIGHT: 123 LBS | HEART RATE: 75 BPM | OXYGEN SATURATION: 93 % | TEMPERATURE: 99.1 F

## 2023-07-20 DIAGNOSIS — K06.8 GINGIVAL BLEEDING: Primary | ICD-10-CM

## 2023-07-20 LAB
EKG ATRIAL RATE: 92 BPM
EKG DIAGNOSIS: NORMAL
EKG Q-T INTERVAL: 396 MS
EKG QRS DURATION: 101 MS
EKG QTC CALCULATION (BAZETT): 482 MS
EKG R AXIS: 21 DEGREES
EKG T AXIS: 50 DEGREES
EKG VENTRICULAR RATE: 89 BPM
ERYTHROCYTE [DISTWIDTH] IN BLOOD BY AUTOMATED COUNT: 13.2 % (ref 11.9–14.6)
HCT VFR BLD AUTO: 38.2 % (ref 35.8–46.3)
HGB BLD-MCNC: 12.6 G/DL (ref 11.7–15.4)
MCH RBC QN AUTO: 32.1 PG (ref 26.1–32.9)
MCHC RBC AUTO-ENTMCNC: 33 G/DL (ref 31.4–35)
MCV RBC AUTO: 97.2 FL (ref 82–102)
NRBC # BLD: 0 K/UL (ref 0–0.2)
PLATELET # BLD AUTO: 252 K/UL (ref 150–450)
PMV BLD AUTO: 9.5 FL (ref 9.4–12.3)
RBC # BLD AUTO: 3.93 M/UL (ref 4.05–5.2)
WBC # BLD AUTO: 10.1 K/UL (ref 4.3–11.1)

## 2023-07-20 PROCEDURE — 2580000003 HC RX 258: Performed by: EMERGENCY MEDICINE

## 2023-07-20 PROCEDURE — 99285 EMERGENCY DEPT VISIT HI MDM: CPT

## 2023-07-20 PROCEDURE — 93005 ELECTROCARDIOGRAM TRACING: CPT | Performed by: EMERGENCY MEDICINE

## 2023-07-20 PROCEDURE — 71045 X-RAY EXAM CHEST 1 VIEW: CPT

## 2023-07-20 PROCEDURE — 93010 ELECTROCARDIOGRAM REPORT: CPT | Performed by: INTERNAL MEDICINE

## 2023-07-20 PROCEDURE — 85027 COMPLETE CBC AUTOMATED: CPT

## 2023-07-20 RX ORDER — 0.9 % SODIUM CHLORIDE 0.9 %
500 INTRAVENOUS SOLUTION INTRAVENOUS ONCE
Status: COMPLETED | OUTPATIENT
Start: 2023-07-20 | End: 2023-07-20

## 2023-07-20 RX ADMIN — SODIUM CHLORIDE 500 ML: 9 INJECTION, SOLUTION INTRAVENOUS at 13:37

## 2023-07-20 ASSESSMENT — PAIN - FUNCTIONAL ASSESSMENT: PAIN_FUNCTIONAL_ASSESSMENT: 0-10

## 2023-07-20 ASSESSMENT — PAIN SCALES - GENERAL: PAINLEVEL_OUTOF10: 0

## 2023-07-20 NOTE — ED PROVIDER NOTES
Emergency Department Provider Note       PCP: None None   Age: 81 y.o. Sex: female     DISPOSITION Decision To Discharge 07/20/2023 02:56:39 PM       ICD-10-CM    1. Gingival bleeding  K06.8           Medical Decision Making     I am uncertain if she had any actual coughing up of blood. She had no coughing with EMS and she has had no coughing so far in the emergency department. She does have some dried blood in her mouth which appears to have come from her gums. Chest x-ray to look for any abnormal lesions or any evidence for a pulmonary infarction or postobstructive pneumonia. Her respiratory status otherwise appears to be fine. Complexity of Problems Addressed:  1 acute illness    Data Reviewed and Analyzed:  Category 1:   I independently ordered and reviewed each unique test.  I reviewed external records: provider visit note from PCP. The patients assessment required an independent historian: EMS, daughter. The reason they were needed is important historical information not provided by the patient and prehospital assessment and treatment. Category 2:   I interpreted the X-rays no obvious mass or lesion. Category 3: Discussion of management or test interpretation. I had an extensive conversation with the patient's daughter. The family is going to have hospice see the patient at her nursing facility. At this time I do not think she has any significant pulmonary bleeding or hemoptysis. Her hemoglobin is stable and her chest x-ray otherwise looks okay. I will discharge the patient. Risk of Complications and/or Morbidity of Patient Management:  Shared medical decision making was utilized in creating the patients health plan today.          History      Cielo Alarcon is a 81 y.o. female who presents to the Emergency Department with chief complaint of    Chief Complaint   Patient presents with    Hemoptysis      81-year-old lady presents via EMS from her nursing facility with concerns TABLET    Take 1 tablet by mouth daily    APIXABAN (ELIQUIS) 5 MG TABS TABLET    Take 2 tablets by mouth 2 times daily for 5 days    APIXABAN (ELIQUIS) 5 MG TABS TABLET    Take 1 tablet by mouth 2 times daily    ASPIRIN 81 MG CHEWABLE TABLET    Take 1 tablet by mouth daily    DONEPEZIL (ARICEPT) 10 MG TABLET    Take 1 tablet by mouth nightly    ESCITALOPRAM (LEXAPRO) 10 MG TABLET    Take 1 tablet by mouth daily    LEVOTHYROXINE (SYNTHROID) 75 MCG TABLET    Take 1 tablet by mouth every morning (before breakfast)    LIDOCAINE 4 % EXTERNAL PATCH    Place 1 patch onto the skin daily    METOPROLOL TARTRATE (LOPRESSOR) 25 MG TABLET    Take 0.5 tablets by mouth 2 times daily    QUETIAPINE (SEROQUEL) 25 MG TABLET    Take 1 tablet by mouth 2 times daily    TRAZODONE (DESYREL) 50 MG TABLET    Take 1 tablet by mouth nightly        Results for orders placed or performed during the hospital encounter of 07/20/23   XR CHEST PORTABLE    Narrative    EXAM: XR CHEST PORTABLE    HISTORY: cough ? ? Hemoptysis. TECHNIQUE: Frontal chest.    COMPARISON: 6/12/2023     FINDINGS:   The cardiac silhouette and mediastinum are within normal limits. Tortuous  descending aorta. Mild prominence of the pulmonary vasculature suggesting congestion. There is no consolidation, pleural effusion, or pneumothorax. No significant osseous abnormalities are observed. Impression    No evidence of an acute intrathoracic process. Tortuous descending aorta. CBC   Result Value Ref Range    WBC 10.1 4.3 - 11.1 K/uL    RBC 3.93 (L) 4.05 - 5.2 M/uL    Hemoglobin 12.6 11.7 - 15.4 g/dL    Hematocrit 38.2 35.8 - 46.3 %    MCV 97.2 82 - 102 FL    MCH 32.1 26.1 - 32.9 PG    MCHC 33.0 31.4 - 35.0 g/dL    RDW 13.2 11.9 - 14.6 %    Platelets 171 568 - 587 K/uL    MPV 9.5 9.4 - 12.3 FL    nRBC 0.00 0.0 - 0.2 K/uL        XR CHEST PORTABLE   Final Result   No evidence of an acute intrathoracic process. Tortuous descending aorta.

## 2023-07-20 NOTE — ED TRIAGE NOTES
Pt arrives via EMS from assisted living facility. Staff called out for hemoptysis(had some bleeding to [facility]) On EMS arrival pt was 89% on RA. Placed on 3L NC.  Pt hx dementia (has been declining last 2 months) on arrival only oriented to person

## 2023-07-20 NOTE — DISCHARGE INSTRUCTIONS
Please continue to take your medications as previously prescribed. Please return with any fevers, difficulty breathing, worsening symptoms, or additional concerns. Follow-up with your primary care doctor and hospice as planned.

## 2025-06-21 NOTE — PROGRESS NOTES
TRANSFER - IN REPORT:    Verbal report received from Kilo(name) on Toni Alicea  being received from ED(unit) for routine progression of care      Report consisted of patients Situation, Background, Assessment and   Recommendations(SBAR). Information from the following report(s) SBAR, ED Summary and Recent Results was reviewed with the receiving nurse. Opportunity for questions and clarification was provided. Assessment completed upon patients arrival to unit and care assumed. [id number]